# Patient Record
Sex: MALE | Race: WHITE | NOT HISPANIC OR LATINO | Employment: OTHER | URBAN - METROPOLITAN AREA
[De-identification: names, ages, dates, MRNs, and addresses within clinical notes are randomized per-mention and may not be internally consistent; named-entity substitution may affect disease eponyms.]

---

## 2017-01-01 ENCOUNTER — HISTORICAL (OUTPATIENT)
Dept: ADMINISTRATIVE | Facility: HOSPITAL | Age: 82
End: 2017-01-01

## 2017-01-01 ENCOUNTER — TELEPHONE (OUTPATIENT)
Dept: GASTROENTEROLOGY | Facility: CLINIC | Age: 82
End: 2017-01-01

## 2017-01-01 ENCOUNTER — TELEPHONE (OUTPATIENT)
Dept: FAMILY MEDICINE | Facility: CLINIC | Age: 82
End: 2017-01-01

## 2017-01-01 ENCOUNTER — PATIENT MESSAGE (OUTPATIENT)
Dept: OTOLARYNGOLOGY | Facility: CLINIC | Age: 82
End: 2017-01-01

## 2017-01-01 ENCOUNTER — DOCUMENTATION ONLY (OUTPATIENT)
Dept: FAMILY MEDICINE | Facility: CLINIC | Age: 82
End: 2017-01-01

## 2017-01-01 ENCOUNTER — SURGERY (OUTPATIENT)
Age: 82
End: 2017-01-01

## 2017-01-01 ENCOUNTER — ANESTHESIA (OUTPATIENT)
Dept: ENDOSCOPY | Facility: HOSPITAL | Age: 82
End: 2017-01-01
Payer: MEDICARE

## 2017-01-01 ENCOUNTER — TELEPHONE (OUTPATIENT)
Dept: UROLOGY | Facility: CLINIC | Age: 82
End: 2017-01-01

## 2017-01-01 ENCOUNTER — NURSE TRIAGE (OUTPATIENT)
Dept: ADMINISTRATIVE | Facility: CLINIC | Age: 82
End: 2017-01-01

## 2017-01-01 ENCOUNTER — OFFICE VISIT (OUTPATIENT)
Dept: GASTROENTEROLOGY | Facility: CLINIC | Age: 82
End: 2017-01-01
Payer: MEDICARE

## 2017-01-01 ENCOUNTER — HOSPITAL ENCOUNTER (OUTPATIENT)
Facility: HOSPITAL | Age: 82
Discharge: HOME HEALTH | End: 2017-03-03
Attending: INTERNAL MEDICINE | Admitting: INTERNAL MEDICINE
Payer: MEDICARE

## 2017-01-01 ENCOUNTER — HOSPITAL ENCOUNTER (INPATIENT)
Facility: HOSPITAL | Age: 82
LOS: 2 days | DRG: 871 | End: 2017-04-20
Attending: HOSPITALIST | Admitting: HOSPITALIST
Payer: MEDICARE

## 2017-01-01 ENCOUNTER — TELEPHONE (OUTPATIENT)
Dept: OTOLARYNGOLOGY | Facility: CLINIC | Age: 82
End: 2017-01-01

## 2017-01-01 ENCOUNTER — LAB VISIT (OUTPATIENT)
Dept: LAB | Facility: HOSPITAL | Age: 82
End: 2017-01-01
Attending: INTERNAL MEDICINE
Payer: MEDICARE

## 2017-01-01 ENCOUNTER — ANESTHESIA EVENT (OUTPATIENT)
Dept: ENDOSCOPY | Facility: HOSPITAL | Age: 82
End: 2017-01-01
Payer: MEDICARE

## 2017-01-01 ENCOUNTER — HOSPITAL ENCOUNTER (OUTPATIENT)
Facility: HOSPITAL | Age: 82
Discharge: HOME | End: 2017-02-16
Attending: INTERNAL MEDICINE | Admitting: INTERNAL MEDICINE
Payer: MEDICARE

## 2017-01-01 ENCOUNTER — OFFICE VISIT (OUTPATIENT)
Dept: OTOLARYNGOLOGY | Facility: CLINIC | Age: 82
End: 2017-01-01
Payer: MEDICARE

## 2017-01-01 ENCOUNTER — HOSPITAL ENCOUNTER (EMERGENCY)
Facility: HOSPITAL | Age: 82
Discharge: HOME | End: 2017-03-26
Attending: EMERGENCY MEDICINE
Payer: MEDICARE

## 2017-01-01 ENCOUNTER — HOSPITAL ENCOUNTER (OUTPATIENT)
Dept: RADIOLOGY | Facility: HOSPITAL | Age: 82
Discharge: HOME | End: 2017-01-04
Attending: OTOLARYNGOLOGY
Payer: MEDICARE

## 2017-01-01 VITALS
WEIGHT: 171 LBS | TEMPERATURE: 99 F | OXYGEN SATURATION: 97 % | SYSTOLIC BLOOD PRESSURE: 140 MMHG | RESPIRATION RATE: 18 BRPM | HEART RATE: 88 BPM | BODY MASS INDEX: 25.91 KG/M2 | DIASTOLIC BLOOD PRESSURE: 66 MMHG | HEIGHT: 68 IN

## 2017-01-01 VITALS
DIASTOLIC BLOOD PRESSURE: 75 MMHG | BODY MASS INDEX: 27 KG/M2 | HEART RATE: 65 BPM | HEIGHT: 67 IN | RESPIRATION RATE: 30 BRPM | TEMPERATURE: 98 F | OXYGEN SATURATION: 100 % | SYSTOLIC BLOOD PRESSURE: 161 MMHG | RESPIRATION RATE: 18 BRPM | WEIGHT: 172 LBS

## 2017-01-01 VITALS
WEIGHT: 174.19 LBS | DIASTOLIC BLOOD PRESSURE: 63 MMHG | SYSTOLIC BLOOD PRESSURE: 109 MMHG | TEMPERATURE: 96 F | BODY MASS INDEX: 26.88 KG/M2 | HEART RATE: 63 BPM

## 2017-01-01 VITALS — DIASTOLIC BLOOD PRESSURE: 56 MMHG | HEART RATE: 76 BPM | SYSTOLIC BLOOD PRESSURE: 122 MMHG

## 2017-01-01 VITALS
WEIGHT: 168 LBS | BODY MASS INDEX: 26.37 KG/M2 | HEART RATE: 63 BPM | OXYGEN SATURATION: 99 % | TEMPERATURE: 99 F | HEIGHT: 67 IN | SYSTOLIC BLOOD PRESSURE: 111 MMHG | RESPIRATION RATE: 18 BRPM | DIASTOLIC BLOOD PRESSURE: 56 MMHG

## 2017-01-01 VITALS
HEIGHT: 68 IN | WEIGHT: 171.75 LBS | SYSTOLIC BLOOD PRESSURE: 152 MMHG | DIASTOLIC BLOOD PRESSURE: 68 MMHG | BODY MASS INDEX: 26.03 KG/M2 | HEART RATE: 68 BPM

## 2017-01-01 VITALS — TEMPERATURE: 97 F | BODY MASS INDEX: 26.68 KG/M2 | HEIGHT: 67 IN | WEIGHT: 170 LBS

## 2017-01-01 DIAGNOSIS — T45.1X5D CHEMOTHERAPY ADVERSE REACTION, SUBSEQUENT ENCOUNTER: ICD-10-CM

## 2017-01-01 DIAGNOSIS — R65.20 SEPSIS WITH METABOLIC ENCEPHALOPATHY: ICD-10-CM

## 2017-01-01 DIAGNOSIS — C09.9 SQUAMOUS CELL CARCINOMA OF RIGHT TONSIL: ICD-10-CM

## 2017-01-01 DIAGNOSIS — I95.81 POSTPROCEDURAL HYPOTENSION: ICD-10-CM

## 2017-01-01 DIAGNOSIS — R63.30 FEEDING DIFFICULTIES: ICD-10-CM

## 2017-01-01 DIAGNOSIS — C79.89 SECONDARY MALIGNANCY OF NECK: ICD-10-CM

## 2017-01-01 DIAGNOSIS — C09.9 SQUAMOUS CELL CARCINOMA OF TONSIL: Primary | ICD-10-CM

## 2017-01-01 DIAGNOSIS — R63.30 FEEDING DIFFICULTY: ICD-10-CM

## 2017-01-01 DIAGNOSIS — R13.10 DYSPHAGIA, UNSPECIFIED TYPE: ICD-10-CM

## 2017-01-01 DIAGNOSIS — C09.9 SQUAMOUS CELL CARCINOMA OF TONSIL: ICD-10-CM

## 2017-01-01 DIAGNOSIS — I77.9 RIGHT-SIDED CAROTID ARTERY DISEASE: Primary | ICD-10-CM

## 2017-01-01 DIAGNOSIS — A41.9 SEPSIS WITH METABOLIC ENCEPHALOPATHY: ICD-10-CM

## 2017-01-01 DIAGNOSIS — K94.22 CELLULITIS AT GASTROSTOMY TUBE SITE: ICD-10-CM

## 2017-01-01 DIAGNOSIS — E03.4 HYPOTHYROIDISM DUE TO ACQUIRED ATROPHY OF THYROID: ICD-10-CM

## 2017-01-01 DIAGNOSIS — I67.2 CEREBRAL ATHEROSCLEROSIS: ICD-10-CM

## 2017-01-01 DIAGNOSIS — C18.7 CANCER OF SIGMOID COLON: Primary | ICD-10-CM

## 2017-01-01 DIAGNOSIS — R29.6 FREQUENT FALLS: ICD-10-CM

## 2017-01-01 DIAGNOSIS — I31.39 PERICARDIAL EFFUSION: ICD-10-CM

## 2017-01-01 DIAGNOSIS — C09.9 RIGHT TONSILLAR SQUAMOUS CELL CARCINOMA: Primary | ICD-10-CM

## 2017-01-01 DIAGNOSIS — C77.0 SECONDARY MALIGNANT NEOPLASM OF LYMPH NODES OF HEAD, FACE, OR NECK: ICD-10-CM

## 2017-01-01 DIAGNOSIS — I10 ESSENTIAL HYPERTENSION: Primary | Chronic | ICD-10-CM

## 2017-01-01 DIAGNOSIS — G93.41 ENCEPHALOPATHY, METABOLIC: Primary | ICD-10-CM

## 2017-01-01 DIAGNOSIS — I95.1 ORTHOSTATIC HYPOTENSION: ICD-10-CM

## 2017-01-01 DIAGNOSIS — G93.40 ENCEPHALOPATHY, UNSPECIFIED: ICD-10-CM

## 2017-01-01 DIAGNOSIS — M62.81 GENERALIZED MUSCLE WEAKNESS: ICD-10-CM

## 2017-01-01 DIAGNOSIS — K13.79 BLEEDING IN MOUTH: Primary | ICD-10-CM

## 2017-01-01 DIAGNOSIS — E86.0 DEHYDRATION, MODERATE: Primary | ICD-10-CM

## 2017-01-01 DIAGNOSIS — F32.9 REACTIVE DEPRESSION: ICD-10-CM

## 2017-01-01 DIAGNOSIS — R41.0 DISORIENTATION: Primary | ICD-10-CM

## 2017-01-01 DIAGNOSIS — Z93.1 STATUS POST INSERTION OF PERCUTANEOUS ENDOSCOPIC GASTROSTOMY (PEG) TUBE: ICD-10-CM

## 2017-01-01 DIAGNOSIS — E03.9 HYPOTHYROIDISM, UNSPECIFIED TYPE: Primary | ICD-10-CM

## 2017-01-01 DIAGNOSIS — C18.7 CANCER OF SIGMOID COLON: ICD-10-CM

## 2017-01-01 DIAGNOSIS — G93.41 SEPSIS WITH METABOLIC ENCEPHALOPATHY: ICD-10-CM

## 2017-01-01 DIAGNOSIS — L03.90 CELLULITIS: ICD-10-CM

## 2017-01-01 DIAGNOSIS — R41.82 ALTERED MENTAL STATE: ICD-10-CM

## 2017-01-01 DIAGNOSIS — L03.319 CELLULITIS AT GASTROSTOMY TUBE SITE: ICD-10-CM

## 2017-01-01 LAB
ABO + RH BLD: NORMAL
ADAMTS13 ACTIVITY: 50 %
ALBUMIN SERPL BCP-MCNC: 2.5 G/DL
ALBUMIN SERPL BCP-MCNC: 2.6 G/DL
ALBUMIN SERPL BCP-MCNC: 2.6 G/DL
ALBUMIN SERPL BCP-MCNC: 2.7 G/DL
ALBUMIN SERPL BCP-MCNC: 2.7 G/DL
ALBUMIN SERPL BCP-MCNC: 3 G/DL
ALBUMIN SERPL-MCNC: 3.2 G/DL (ref 3.1–4.7)
ALBUMIN SERPL-MCNC: 3.6 G/DL (ref 3.1–4.7)
ALBUMIN SERPL-MCNC: 3.9 G/DL (ref 3.1–4.7)
ALBUMIN SERPL-MCNC: 4 G/DL (ref 3.1–4.7)
ALBUMIN SERPL-MCNC: 4.2 G/DL (ref 3.1–4.7)
ALP SERPL-CCNC: 110 U/L
ALP SERPL-CCNC: 118 U/L
ALP SERPL-CCNC: 140 U/L
ALP SERPL-CCNC: 49 U/L
ALP SERPL-CCNC: 54 U/L
ALP SERPL-CCNC: 56 IU/L (ref 40–104)
ALP SERPL-CCNC: 57 IU/L (ref 40–104)
ALP SERPL-CCNC: 61 U/L
ALP SERPL-CCNC: 66 IU/L (ref 40–104)
ALP SERPL-CCNC: 69 IU/L (ref 40–104)
ALP SERPL-CCNC: 69 IU/L (ref 40–104)
ALP SERPL-CCNC: 97 U/L
ALT (SGPT): 12 IU/L (ref 3–33)
ALT (SGPT): 14 IU/L (ref 3–33)
ALT (SGPT): 14 IU/L (ref 3–33)
ALT (SGPT): 16 IU/L (ref 3–33)
ALT (SGPT): 17 IU/L (ref 3–33)
ALT SERPL W/O P-5'-P-CCNC: 11 U/L
ALT SERPL W/O P-5'-P-CCNC: 154 U/L
ALT SERPL W/O P-5'-P-CCNC: 155 U/L
ALT SERPL W/O P-5'-P-CCNC: 176 U/L
ALT SERPL W/O P-5'-P-CCNC: 209 U/L
AMMONIA PLAS-SCNC: 29 UMOL/L
AMMONIA PLAS-SCNC: 29 UMOL/L
ANION GAP SERPL CALC-SCNC: 10 MMOL/L
ANION GAP SERPL CALC-SCNC: 11 MMOL/L
ANION GAP SERPL CALC-SCNC: 12 MMOL/L
ANION GAP SERPL CALC-SCNC: 14 MMOL/L
ANION GAP SERPL CALC-SCNC: 15 MMOL/L
ANION GAP SERPL CALC-SCNC: 17 MMOL/L
ANION GAP SERPL CALC-SCNC: 18 MMOL/L
ANION GAP SERPL CALC-SCNC: 8 MMOL/L
ANISOCYTOSIS BLD QL SMEAR: ABNORMAL
ANISOCYTOSIS BLD QL SMEAR: ABNORMAL
AORTIC VALVE REGURGITATION: ABNORMAL
APTT BLDCRRT: 29.9 SEC
APTT BLDCRRT: 34 SEC
AST SERPL-CCNC: 152 U/L
AST SERPL-CCNC: 157 U/L
AST SERPL-CCNC: 162 U/L
AST SERPL-CCNC: 19 U/L
AST SERPL-CCNC: 19 U/L
AST SERPL-CCNC: 196 U/L
AST SERPL-CCNC: 21 U/L
AST SERPL-CCNC: 22 IU/L (ref 10–40)
AST SERPL-CCNC: 23 IU/L (ref 10–40)
AST SERPL-CCNC: 23 IU/L (ref 10–40)
AST SERPL-CCNC: 26 IU/L (ref 10–40)
AST SERPL-CCNC: 28 IU/L (ref 10–40)
BASOPHILS # BLD AUTO: 0 K/UL
BASOPHILS # BLD AUTO: 0.1 K/UL
BASOPHILS NFR BLD: 0 %
BASOPHILS NFR BLD: 0.1 %
BASOPHILS NFR BLD: 0.1 K/UL (ref 0–0.2)
BASOPHILS NFR BLD: 0.3 %
BASOPHILS NFR BLD: 0.5 %
BASOPHILS NFR BLD: 0.5 %
BASOPHILS NFR BLD: 0.7 %
BASOPHILS NFR BLD: 0.8 %
BASOPHILS NFR BLD: 0.8 %
BASOPHILS NFR BLD: 0.9 %
BASOPHILS NFR BLD: 1.2 %
BILIRUB SERPL-MCNC: 0.5 MG/DL (ref 0.3–1)
BILIRUB SERPL-MCNC: 0.7 MG/DL
BILIRUB SERPL-MCNC: 0.7 MG/DL (ref 0.3–1)
BILIRUB SERPL-MCNC: 0.8 MG/DL
BILIRUB SERPL-MCNC: 0.9 MG/DL
BILIRUB SERPL-MCNC: 1.2 MG/DL (ref 0.3–1)
BILIRUB SERPL-MCNC: 1.3 MG/DL (ref 0.3–1)
BILIRUB SERPL-MCNC: 1.4 MG/DL (ref 0.3–1)
BILIRUB SERPL-MCNC: 4.1 MG/DL
BILIRUB SERPL-MCNC: 4.2 MG/DL
BILIRUB SERPL-MCNC: 5 MG/DL
BILIRUB SERPL-MCNC: 6.2 MG/DL
BILIRUB UR QL STRIP: NEGATIVE
BLD GP AB SCN CELLS X3 SERPL QL: NORMAL
BLD GP AB SCN CELLS X3 SERPL QL: NORMAL
BLD PROD TYP BPU: NORMAL
BLOOD UNIT EXPIRATION DATE: NORMAL
BLOOD UNIT TYPE CODE: 5100
BLOOD UNIT TYPE CODE: 6200
BLOOD UNIT TYPE CODE: 6200
BLOOD UNIT TYPE: NORMAL
BNP SERPL-MCNC: 1155 PG/ML
BUN SERPL-MCNC: 14 MG/DL
BUN SERPL-MCNC: 16 MG/DL
BUN SERPL-MCNC: 16 MG/DL (ref 8–20)
BUN SERPL-MCNC: 16 MG/DL (ref 8–20)
BUN SERPL-MCNC: 18 MG/DL
BUN SERPL-MCNC: 18 MG/DL (ref 8–20)
BUN SERPL-MCNC: 22 MG/DL (ref 8–20)
BUN SERPL-MCNC: 25 MG/DL (ref 8–20)
BUN SERPL-MCNC: 50 MG/DL
BUN SERPL-MCNC: 56 MG/DL
BUN SERPL-MCNC: 57 MG/DL
BUN SERPL-MCNC: 64 MG/DL
BUN SERPL-MCNC: 80 MG/DL
CALCIUM SERPL-MCNC: 8 MG/DL
CALCIUM SERPL-MCNC: 8.1 MG/DL
CALCIUM SERPL-MCNC: 8.2 MG/DL
CALCIUM SERPL-MCNC: 8.2 MG/DL
CALCIUM SERPL-MCNC: 8.4 MG/DL (ref 7.7–10.4)
CALCIUM SERPL-MCNC: 8.6 MG/DL
CALCIUM SERPL-MCNC: 8.7 MG/DL (ref 7.7–10.4)
CALCIUM SERPL-MCNC: 8.8 MG/DL (ref 7.7–10.4)
CALCIUM SERPL-MCNC: 8.9 MG/DL (ref 7.7–10.4)
CALCIUM SERPL-MCNC: 9 MG/DL (ref 7.7–10.4)
CALCIUM SERPL-MCNC: 9.1 MG/DL
CHLORIDE SERPL-SCNC: 101 MMOL/L
CHLORIDE SERPL-SCNC: 104 MMOL/L
CHLORIDE SERPL-SCNC: 89 MMOL/L
CHLORIDE SERPL-SCNC: 91 MMOL/L
CHLORIDE SERPL-SCNC: 92 MMOL/L
CHLORIDE SERPL-SCNC: 92 MMOL/L
CHLORIDE SERPL-SCNC: 95 MMOL/L
CHLORIDE SERPL-SCNC: 99 MMOL/L
CHLORIDE: 100 MMOL/L (ref 98–110)
CHLORIDE: 97 MMOL/L (ref 98–110)
CHLORIDE: 98 MMOL/L (ref 98–110)
CHLORIDE: 98 MMOL/L (ref 98–110)
CHLORIDE: 99 MMOL/L (ref 98–110)
CLARITY UR: CLEAR
CO2 SERPL-SCNC: 17 MMOL/L
CO2 SERPL-SCNC: 18 MMOL/L
CO2 SERPL-SCNC: 21 MMOL/L
CO2 SERPL-SCNC: 22 MMOL/L
CO2 SERPL-SCNC: 23 MMOL/L
CO2 SERPL-SCNC: 24.9 MMOL/L (ref 22.8–31.6)
CO2 SERPL-SCNC: 25 MMOL/L
CO2 SERPL-SCNC: 25 MMOL/L
CO2 SERPL-SCNC: 26 MMOL/L
CO2 SERPL-SCNC: 26.9 MMOL/L (ref 22.8–31.6)
CO2 SERPL-SCNC: 27.5 MMOL/L (ref 22.8–31.6)
CO2 SERPL-SCNC: 28.1 MMOL/L (ref 22.8–31.6)
CO2 SERPL-SCNC: 28.2 MMOL/L (ref 22.8–31.6)
COAGULATION SPECIALIST REVIEW: ABNORMAL
CODING SYSTEM: NORMAL
COLOR UR: YELLOW
CREAT SERPL-MCNC: 0.9 MG/DL
CREAT SERPL-MCNC: 0.9 MG/DL
CREAT SERPL-MCNC: 1 MG/DL
CREAT SERPL-MCNC: 1.4 MG/DL
CREAT SERPL-MCNC: 1.8 MG/DL
CREAT SERPL-MCNC: 2.4 MG/DL
CREATININE: 0.91 MG/DL (ref 0.6–1.4)
CREATININE: 1.02 MG/DL (ref 0.6–1.4)
CREATININE: 1.05 MG/DL (ref 0.6–1.4)
CREATININE: 1.17 MG/DL (ref 0.6–1.4)
CREATININE: 1.37 MG/DL (ref 0.6–1.4)
D DIMER PPP IA.FEU-MCNC: 23.98 MG/L FEU
DAT IGG-SP REAG RBC-IMP: NORMAL
DIASTOLIC DYSFUNCTION: NO
DIFFERENTIAL METHOD: ABNORMAL
DISPENSE STATUS: NORMAL
EOSINOPHIL # BLD AUTO: 0 K/UL
EOSINOPHIL # BLD AUTO: 0.2 K/UL
EOSINOPHIL # BLD AUTO: 0.2 K/UL
EOSINOPHIL # BLD AUTO: 0.3 K/UL
EOSINOPHIL # BLD AUTO: 0.3 K/UL
EOSINOPHIL NFR BLD: 0 %
EOSINOPHIL NFR BLD: 0.1 K/UL (ref 0–0.7)
EOSINOPHIL NFR BLD: 0.2 %
EOSINOPHIL NFR BLD: 0.2 %
EOSINOPHIL NFR BLD: 0.2 K/UL (ref 0–0.7)
EOSINOPHIL NFR BLD: 0.2 K/UL (ref 0–0.7)
EOSINOPHIL NFR BLD: 0.3 %
EOSINOPHIL NFR BLD: 0.3 %
EOSINOPHIL NFR BLD: 1.5 %
EOSINOPHIL NFR BLD: 1.6 %
EOSINOPHIL NFR BLD: 1.7 %
EOSINOPHIL NFR BLD: 1.8 %
EOSINOPHIL NFR BLD: 2 %
EOSINOPHIL NFR BLD: 2.8 %
EOSINOPHIL NFR BLD: 2.8 %
ERYTHROCYTE [DISTWIDTH] IN BLOOD BY AUTOMATED COUNT: 13.6 % (ref 12.5–14.5)
ERYTHROCYTE [DISTWIDTH] IN BLOOD BY AUTOMATED COUNT: 13.9 %
ERYTHROCYTE [DISTWIDTH] IN BLOOD BY AUTOMATED COUNT: 13.9 % (ref 12.5–14.5)
ERYTHROCYTE [DISTWIDTH] IN BLOOD BY AUTOMATED COUNT: 14 % (ref 12.5–14.5)
ERYTHROCYTE [DISTWIDTH] IN BLOOD BY AUTOMATED COUNT: 14.1 %
ERYTHROCYTE [DISTWIDTH] IN BLOOD BY AUTOMATED COUNT: 14.2 %
ERYTHROCYTE [DISTWIDTH] IN BLOOD BY AUTOMATED COUNT: 14.2 % (ref 12.5–14.5)
ERYTHROCYTE [DISTWIDTH] IN BLOOD BY AUTOMATED COUNT: 14.4 % (ref 12.5–14.5)
ERYTHROCYTE [DISTWIDTH] IN BLOOD BY AUTOMATED COUNT: 14.8 %
ERYTHROCYTE [DISTWIDTH] IN BLOOD BY AUTOMATED COUNT: 22.3 %
ERYTHROCYTE [DISTWIDTH] IN BLOOD BY AUTOMATED COUNT: 22.3 %
ERYTHROCYTE [DISTWIDTH] IN BLOOD BY AUTOMATED COUNT: 22.4 %
ERYTHROCYTE [DISTWIDTH] IN BLOOD BY AUTOMATED COUNT: 23.2 %
ERYTHROCYTE [DISTWIDTH] IN BLOOD BY AUTOMATED COUNT: 23.7 %
EST. GFR  (AFRICAN AMERICAN): 27 ML/MIN/1.73 M^2
EST. GFR  (AFRICAN AMERICAN): 39 ML/MIN/1.73 M^2
EST. GFR  (AFRICAN AMERICAN): 52 ML/MIN/1.73 M^2
EST. GFR  (AFRICAN AMERICAN): >60 ML/MIN/1.73 M^2
EST. GFR  (NON AFRICAN AMERICAN): 24 ML/MIN/1.73 M^2
EST. GFR  (NON AFRICAN AMERICAN): 33 ML/MIN/1.73 M^2
EST. GFR  (NON AFRICAN AMERICAN): 45 ML/MIN/1.73 M^2
EST. GFR  (NON AFRICAN AMERICAN): >60 ML/MIN/1.73 M^2
FIBRINOGEN PPP-MCNC: 91 MG/DL
FIBRINOGEN PPP-MCNC: 92 MG/DL
FIBRINOGEN PPP-MCNC: 96 MG/DL
GLOBAL PERICARDIAL EFFUSION: ABNORMAL
GLUCOSE SERPL-MCNC: 102 MG/DL
GLUCOSE SERPL-MCNC: 103 MG/DL
GLUCOSE SERPL-MCNC: 105 MG/DL
GLUCOSE SERPL-MCNC: 113 MG/DL
GLUCOSE SERPL-MCNC: 115 MG/DL
GLUCOSE SERPL-MCNC: 128 MG/DL
GLUCOSE SERPL-MCNC: 192 MG/DL
GLUCOSE SERPL-MCNC: 275 MG/DL
GLUCOSE UR QL STRIP: NEGATIVE
GLUCOSE: 106 MG/DL (ref 70–99)
GLUCOSE: 121 MG/DL (ref 70–99)
GLUCOSE: 141 MG/DL (ref 70–99)
GLUCOSE: 95 MG/DL (ref 70–99)
GLUCOSE: 97 MG/DL (ref 70–99)
GRAN #: 5.4 K/UL (ref 1.4–6.5)
GRAN #: 6.1 K/UL (ref 1.4–6.5)
GRAN #: 6.6 K/UL (ref 1.4–6.5)
GRAN #: 6.8 K/UL (ref 1.4–6.5)
GRAN #: 8.3 K/UL (ref 1.4–6.5)
GRAN%: 69.3 %
GRAN%: 78 %
GRAN%: 78.3 %
GRAN%: 82.1 %
GRAN%: 87.3 %
HAPTOGLOB SERPL-MCNC: <10 MG/DL
HAPTOGLOB SERPL-MCNC: <10 MG/DL
HCT VFR BLD AUTO: 27.4 %
HCT VFR BLD AUTO: 27.7 %
HCT VFR BLD AUTO: 27.8 %
HCT VFR BLD AUTO: 27.8 %
HCT VFR BLD AUTO: 28.1 %
HCT VFR BLD AUTO: 34.6 %
HCT VFR BLD AUTO: 34.8 % (ref 39–55)
HCT VFR BLD AUTO: 35 %
HCT VFR BLD AUTO: 39.4 %
HCT VFR BLD AUTO: 39.6 %
HCT VFR BLD AUTO: 39.9 % (ref 39–55)
HCT VFR BLD AUTO: 40.5 % (ref 39–55)
HCT VFR BLD AUTO: 40.8 % (ref 39–55)
HCT VFR BLD AUTO: 41.9 % (ref 39–55)
HGB BLD-MCNC: 11.4 G/DL
HGB BLD-MCNC: 11.4 G/DL (ref 14–16)
HGB BLD-MCNC: 11.5 G/DL
HGB BLD-MCNC: 12.8 G/DL
HGB BLD-MCNC: 12.8 G/DL (ref 14–16)
HGB BLD-MCNC: 13 G/DL
HGB BLD-MCNC: 13.1 G/DL (ref 14–16)
HGB BLD-MCNC: 13.2 G/DL (ref 14–16)
HGB BLD-MCNC: 13.6 G/DL (ref 14–16)
HGB BLD-MCNC: 8.4 G/DL
HGB BLD-MCNC: 8.7 G/DL
HGB BLD-MCNC: 8.8 G/DL
HGB BLD-MCNC: 9 G/DL
HGB BLD-MCNC: 9.2 G/DL
HGB UR QL STRIP: ABNORMAL
IMMATURE GRANS (ABS): 0 K/UL (ref 0–1)
IMMATURE GRANS (ABS): 0.1 K/UL (ref 0–1)
IMMATURE GRANULOCYTES: 0.2 %
IMMATURE GRANULOCYTES: 0.3 %
IMMATURE GRANULOCYTES: 0.3 %
IMMATURE GRANULOCYTES: 0.4 %
IMMATURE GRANULOCYTES: 0.5 %
INR PPP: 1.1
INR PPP: 1.9
KETONES UR QL STRIP: NEGATIVE
LACTATE SERPL-SCNC: 1.9 MMOL/L
LACTATE SERPL-SCNC: 3.3 MMOL/L
LDH SERPL L TO P-CCNC: 1036 U/L
LEUKOCYTE ESTERASE UR QL STRIP: NEGATIVE
LYMPH #: 0.3 K/UL (ref 1.2–3.4)
LYMPH #: 0.4 K/UL (ref 1.2–3.4)
LYMPH #: 0.6 K/UL (ref 1.2–3.4)
LYMPH #: 0.9 K/UL (ref 1.2–3.4)
LYMPH #: 1.3 K/UL (ref 1.2–3.4)
LYMPH%: 10.6 %
LYMPH%: 17.1 %
LYMPH%: 3 %
LYMPH%: 5 %
LYMPH%: 7.5 %
LYMPHOCYTES # BLD AUTO: 0.2 K/UL
LYMPHOCYTES # BLD AUTO: 0.3 K/UL
LYMPHOCYTES # BLD AUTO: 0.3 K/UL
LYMPHOCYTES # BLD AUTO: 0.5 K/UL
LYMPHOCYTES # BLD AUTO: 1 K/UL
LYMPHOCYTES NFR BLD: 1.1 %
LYMPHOCYTES NFR BLD: 1.5 %
LYMPHOCYTES NFR BLD: 1.8 %
LYMPHOCYTES NFR BLD: 10.3 %
LYMPHOCYTES NFR BLD: 2.1 %
LYMPHOCYTES NFR BLD: 2.2 %
LYMPHOCYTES NFR BLD: 3.1 %
LYMPHOCYTES NFR BLD: 3.8 %
LYMPHOCYTES NFR BLD: 5 %
MAGNESIUM SERPL-MCNC: 2.2 MG/DL
MAGNESIUM SERPL-MCNC: 2.4 MG/DL
MCH RBC QN AUTO: 27.8 PG
MCH RBC QN AUTO: 28 PG
MCH RBC QN AUTO: 28.1 PG
MCH RBC QN AUTO: 28.2 PG
MCH RBC QN AUTO: 28.2 PG (ref 25–35)
MCH RBC QN AUTO: 28.2 PG (ref 25–35)
MCH RBC QN AUTO: 28.3 PG
MCH RBC QN AUTO: 28.3 PG (ref 25–35)
MCH RBC QN AUTO: 28.4 PG (ref 25–35)
MCH RBC QN AUTO: 28.6 PG (ref 25–35)
MCH RBC QN AUTO: 28.9 PG
MCH RBC QN AUTO: 28.9 PG
MCH RBC QN AUTO: 29.1 PG
MCH RBC QN AUTO: 29.8 PG
MCHC RBC AUTO-ENTMCNC: 30.5 %
MCHC RBC AUTO-ENTMCNC: 31.7 %
MCHC RBC AUTO-ENTMCNC: 31.8 %
MCHC RBC AUTO-ENTMCNC: 31.9 %
MCHC RBC AUTO-ENTMCNC: 32.1 G/DL (ref 31–36)
MCHC RBC AUTO-ENTMCNC: 32.2 %
MCHC RBC AUTO-ENTMCNC: 32.3 G/DL (ref 31–36)
MCHC RBC AUTO-ENTMCNC: 32.4 G/DL (ref 31–36)
MCHC RBC AUTO-ENTMCNC: 32.5 G/DL (ref 31–36)
MCHC RBC AUTO-ENTMCNC: 32.8 G/DL (ref 31–36)
MCHC RBC AUTO-ENTMCNC: 32.9 %
MCHC RBC AUTO-ENTMCNC: 32.9 %
MCHC RBC AUTO-ENTMCNC: 33 %
MCHC RBC AUTO-ENTMCNC: 33.1 %
MCV RBC AUTO: 86 FL
MCV RBC AUTO: 86.7 FL (ref 80–100)
MCV RBC AUTO: 87.2 FL (ref 80–100)
MCV RBC AUTO: 87.2 FL (ref 80–100)
MCV RBC AUTO: 87.7 FL (ref 80–100)
MCV RBC AUTO: 88.1 FL (ref 80–100)
MCV RBC AUTO: 90 FL
MCV RBC AUTO: 91 FL
MCV RBC AUTO: 92 FL
MICROSCOPIC COMMENT: ABNORMAL
MONO #: 0.7 K/UL (ref 0.1–0.6)
MONO #: 0.8 K/UL (ref 0.1–0.6)
MONO #: 0.9 K/UL (ref 0.1–0.6)
MONO%: 10.1 %
MONO%: 11.4 %
MONO%: 7 %
MONO%: 8.3 %
MONO%: 9.3 %
MONOCYTES # BLD AUTO: 0.6 K/UL
MONOCYTES # BLD AUTO: 0.8 K/UL
MONOCYTES # BLD AUTO: 1 K/UL
MONOCYTES # BLD AUTO: 1 K/UL
MONOCYTES NFR BLD: 1 %
MONOCYTES NFR BLD: 4.6 %
MONOCYTES NFR BLD: 4.7 %
MONOCYTES NFR BLD: 4.9 %
MONOCYTES NFR BLD: 7.1 %
MONOCYTES NFR BLD: 7.6 %
MONOCYTES NFR BLD: 8 %
MONOCYTES NFR BLD: 8.4 %
MONOCYTES NFR BLD: 8.8 %
NEUTROPHILS # BLD AUTO: 10 K/UL
NEUTROPHILS # BLD AUTO: 11 K/UL
NEUTROPHILS # BLD AUTO: 11.5 K/UL
NEUTROPHILS # BLD AUTO: 11.6 K/UL
NEUTROPHILS # BLD AUTO: 12 K/UL
NEUTROPHILS # BLD AUTO: 13 K/UL
NEUTROPHILS # BLD AUTO: 7.8 K/UL
NEUTROPHILS # BLD AUTO: 8.2 K/UL
NEUTROPHILS NFR BLD: 79 %
NEUTROPHILS NFR BLD: 85.2 %
NEUTROPHILS NFR BLD: 85.9 %
NEUTROPHILS NFR BLD: 87 %
NEUTROPHILS NFR BLD: 88.7 %
NEUTROPHILS NFR BLD: 90.1 %
NEUTROPHILS NFR BLD: 93 %
NEUTROPHILS NFR BLD: 93.5 %
NEUTROPHILS NFR BLD: 93.6 %
NEUTS BAND NFR BLD MANUAL: 7 %
NITRITE UR QL STRIP: NEGATIVE
NRBC BLD-RTO: 2 /100 WBC
NUCLEATED RBCS: 0 %
NUCLEATED RED BLOOD CELLS: 0 /100 WBC
OSMOLALITY SERPL: 290 MOSM/KG
OVALOCYTES BLD QL SMEAR: ABNORMAL
PERFORMED BY:: ABNORMAL
PERFORMED BY:: NORMAL
PH UR STRIP: 6 [PH] (ref 5–8)
PHOSPHATE SERPL-MCNC: 5 MG/DL
PHOSPHATE SERPL-MCNC: 5.6 MG/DL
PHOSPHATE SERPL-MCNC: 6.4 MG/DL
PLATELET # BLD AUTO: 10 K/UL
PLATELET # BLD AUTO: 104 K/UL (ref 140–440)
PLATELET # BLD AUTO: 109 K/UL (ref 140–440)
PLATELET # BLD AUTO: 164 K/UL (ref 140–440)
PLATELET # BLD AUTO: 179 K/UL
PLATELET # BLD AUTO: 212 K/UL (ref 140–440)
PLATELET # BLD AUTO: 45 K/UL
PLATELET # BLD AUTO: 6 K/UL
PLATELET # BLD AUTO: 68 K/UL
PLATELET # BLD AUTO: 7 K/UL
PLATELET # BLD AUTO: 75 K/UL
PLATELET # BLD AUTO: 81 K/UL (ref 140–440)
PLATELET # BLD AUTO: 9 K/UL
PLATELET # BLD AUTO: 90 K/UL
PLATELET BLD QL SMEAR: ABNORMAL
PMV BLD AUTO: 10.1 FL (ref 8.8–12.7)
PMV BLD AUTO: 10.4 FL
PMV BLD AUTO: 7.3 FL
PMV BLD AUTO: 7.5 FL
PMV BLD AUTO: 7.9 FL
PMV BLD AUTO: 8 FL
PMV BLD AUTO: 8 FL
PMV BLD AUTO: 8.3 FL
PMV BLD AUTO: 8.4 FL (ref 7.4–10.4)
PMV BLD AUTO: 8.8 FL
PMV BLD AUTO: 8.9 FL (ref 7.4–10.4)
PMV BLD AUTO: 9.1 FL (ref 8.8–12.7)
PMV BLD AUTO: 9.4 FL (ref 8.8–12.7)
PMV BLD AUTO: 9.6 FL
POIKILOCYTOSIS BLD QL SMEAR: SLIGHT
POIKILOCYTOSIS BLD QL SMEAR: SLIGHT
POLYCHROMASIA BLD QL SMEAR: ABNORMAL
POLYCHROMASIA BLD QL SMEAR: ABNORMAL
POTASSIUM SERPL-SCNC: 3.8 MMOL/L
POTASSIUM SERPL-SCNC: 4 MMOL/L (ref 3.5–5)
POTASSIUM SERPL-SCNC: 4.1 MMOL/L
POTASSIUM SERPL-SCNC: 4.1 MMOL/L
POTASSIUM SERPL-SCNC: 4.2 MMOL/L (ref 3.5–5)
POTASSIUM SERPL-SCNC: 4.2 MMOL/L (ref 3.5–5)
POTASSIUM SERPL-SCNC: 4.5 MMOL/L
POTASSIUM SERPL-SCNC: 4.5 MMOL/L (ref 3.5–5)
POTASSIUM SERPL-SCNC: 4.6 MMOL/L
POTASSIUM SERPL-SCNC: 4.7 MMOL/L
POTASSIUM SERPL-SCNC: 4.7 MMOL/L (ref 3.5–5)
POTASSIUM SERPL-SCNC: 4.8 MMOL/L
POTASSIUM SERPL-SCNC: 5 MMOL/L
PROT SERPL-MCNC: 5.5 G/DL
PROT SERPL-MCNC: 5.6 G/DL
PROT SERPL-MCNC: 5.7 G/DL
PROT SERPL-MCNC: 5.7 G/DL
PROT SERPL-MCNC: 5.8 G/DL
PROT SERPL-MCNC: 5.8 G/DL
PROT SERPL-MCNC: 6.4 G/DL (ref 6–8.2)
PROT SERPL-MCNC: 6.5 G/DL
PROT SERPL-MCNC: 6.9 G/DL (ref 6–8.2)
PROT SERPL-MCNC: 7.1 G/DL (ref 6–8.2)
PROT SERPL-MCNC: 7.3 G/DL (ref 6–8.2)
PROT SERPL-MCNC: 7.5 G/DL (ref 6–8.2)
PROT UR QL STRIP: NEGATIVE
PROTHROMBIN TIME: 11.9 SEC
PROTHROMBIN TIME: 19.9 SEC
RBC # BLD AUTO: 3.01 M/UL
RBC # BLD AUTO: 3.01 M/UL
RBC # BLD AUTO: 3.04 M/UL
RBC # BLD AUTO: 3.08 M/UL
RBC # BLD AUTO: 3.08 M/UL
RBC # BLD AUTO: 3.99 M/UL (ref 4.3–5.9)
RBC # BLD AUTO: 4.04 M/UL
RBC # BLD AUTO: 4.09 M/UL
RBC # BLD AUTO: 4.53 M/UL (ref 4.3–5.9)
RBC # BLD AUTO: 4.59 M/UL
RBC # BLD AUTO: 4.6 M/UL
RBC # BLD AUTO: 4.62 M/UL (ref 4.3–5.9)
RBC # BLD AUTO: 4.68 M/UL (ref 4.3–5.9)
RBC # BLD AUTO: 4.83 M/UL (ref 4.3–5.9)
RBC #/AREA URNS HPF: 5 /HPF (ref 0–4)
RETIRED EF AND QEF - SEE NOTES: 70 (ref 55–65)
SCHISTOCYTES BLD QL SMEAR: PRESENT
SCHISTOCYTES BLD QL SMEAR: PRESENT
SODIUM SERPL-SCNC: 125 MMOL/L
SODIUM SERPL-SCNC: 126 MMOL/L
SODIUM SERPL-SCNC: 127 MMOL/L
SODIUM SERPL-SCNC: 127 MMOL/L
SODIUM SERPL-SCNC: 130 MMOL/L
SODIUM SERPL-SCNC: 136 MMOL/L
SODIUM SERPL-SCNC: 136 MMOL/L
SODIUM SERPL-SCNC: 138 MMOL/L
SODIUM: 132 MMOL/L (ref 134–144)
SODIUM: 132 MMOL/L (ref 134–144)
SODIUM: 133 MMOL/L (ref 134–144)
SODIUM: 135 MMOL/L (ref 134–144)
SODIUM: 135 MMOL/L (ref 134–144)
SP GR UR STRIP: 1.01 (ref 1–1.03)
TARGETS BLD QL SMEAR: ABNORMAL
TARGETS BLD QL SMEAR: ABNORMAL
TRANS PLATPHERESIS VOL PATIENT: NORMAL ML
TRANS PLATPHERESIS VOL PATIENT: NORMAL ML
UNIT NUMBER: NORMAL
URN SPEC COLLECT METH UR: ABNORMAL
UROBILINOGEN UR STRIP-ACNC: NEGATIVE EU/DL
WBC # BLD AUTO: 11.3 K/UL
WBC # BLD AUTO: 12.3 K/UL
WBC # BLD AUTO: 12.8 K/UL
WBC # BLD AUTO: 12.9 K/UL
WBC # BLD AUTO: 12.9 K/UL
WBC # BLD AUTO: 13.9 K/UL
WBC # BLD AUTO: 19.1 K/UL
WBC # BLD AUTO: 9.7 K/UL
WBC # BLD AUTO: 9.9 K/UL
WBC # BLD: 7.7 K/UL (ref 5–10)
WBC # BLD: 7.8 K/UL (ref 5–10)
WBC # BLD: 8.3 K/UL (ref 5–10)
WBC # BLD: 8.5 K/UL (ref 5–10)
WBC # BLD: 9.5 K/UL (ref 5–10)
WBC #/AREA URNS HPF: 1 /HPF (ref 0–5)

## 2017-01-01 PROCEDURE — G0378 HOSPITAL OBSERVATION PER HR: HCPCS

## 2017-01-01 PROCEDURE — 85025 COMPLETE CBC W/AUTO DIFF WBC: CPT

## 2017-01-01 PROCEDURE — 83615 LACTATE (LD) (LDH) ENZYME: CPT

## 2017-01-01 PROCEDURE — D9220A PRA ANESTHESIA: Mod: CRNA,,, | Performed by: NURSE ANESTHETIST, CERTIFIED REGISTERED

## 2017-01-01 PROCEDURE — 86920 COMPATIBILITY TEST SPIN: CPT

## 2017-01-01 PROCEDURE — 27201018 HC KIT, PEG (ANY): Performed by: INTERNAL MEDICINE

## 2017-01-01 PROCEDURE — 86023 IMMUNOGLOBULIN ASSAY: CPT

## 2017-01-01 PROCEDURE — 84100 ASSAY OF PHOSPHORUS: CPT

## 2017-01-01 PROCEDURE — 80053 COMPREHEN METABOLIC PANEL: CPT

## 2017-01-01 PROCEDURE — 83930 ASSAY OF BLOOD OSMOLALITY: CPT

## 2017-01-01 PROCEDURE — 63600175 PHARM REV CODE 636 W HCPCS: Performed by: INTERNAL MEDICINE

## 2017-01-01 PROCEDURE — 83735 ASSAY OF MAGNESIUM: CPT

## 2017-01-01 PROCEDURE — 94640 AIRWAY INHALATION TREATMENT: CPT

## 2017-01-01 PROCEDURE — 63600175 PHARM REV CODE 636 W HCPCS: Performed by: HOSPITALIST

## 2017-01-01 PROCEDURE — 99214 OFFICE O/P EST MOD 30 MIN: CPT | Mod: S$GLB,,, | Performed by: OTOLARYNGOLOGY

## 2017-01-01 PROCEDURE — 99999 PR PBB SHADOW E&M-EST. PATIENT-LVL III: CPT | Mod: PBBFAC,,, | Performed by: OTOLARYNGOLOGY

## 2017-01-01 PROCEDURE — 25000242 PHARM REV CODE 250 ALT 637 W/ HCPCS: Performed by: HOSPITALIST

## 2017-01-01 PROCEDURE — 1157F ADVNC CARE PLAN IN RCRD: CPT | Mod: S$GLB,,, | Performed by: OTOLARYNGOLOGY

## 2017-01-01 PROCEDURE — 86880 COOMBS TEST DIRECT: CPT

## 2017-01-01 PROCEDURE — 37000008 HC ANESTHESIA 1ST 15 MINUTES: Performed by: INTERNAL MEDICINE

## 2017-01-01 PROCEDURE — 63600175 PHARM REV CODE 636 W HCPCS

## 2017-01-01 PROCEDURE — 83010 ASSAY OF HAPTOGLOBIN QUANT: CPT

## 2017-01-01 PROCEDURE — 85379 FIBRIN DEGRADATION QUANT: CPT

## 2017-01-01 PROCEDURE — 36415 COLL VENOUS BLD VENIPUNCTURE: CPT

## 2017-01-01 PROCEDURE — 1159F MED LIST DOCD IN RCRD: CPT | Mod: S$GLB,,, | Performed by: INTERNAL MEDICINE

## 2017-01-01 PROCEDURE — 12000002 HC ACUTE/MED SURGE SEMI-PRIVATE ROOM

## 2017-01-01 PROCEDURE — 1160F RVW MEDS BY RX/DR IN RCRD: CPT | Mod: S$GLB,,, | Performed by: INTERNAL MEDICINE

## 2017-01-01 PROCEDURE — 25000003 PHARM REV CODE 250: Performed by: HOSPITALIST

## 2017-01-01 PROCEDURE — 93306 TTE W/DOPPLER COMPLETE: CPT | Mod: 26,,, | Performed by: INTERNAL MEDICINE

## 2017-01-01 PROCEDURE — 85384 FIBRINOGEN ACTIVITY: CPT

## 2017-01-01 PROCEDURE — 97802 MEDICAL NUTRITION INDIV IN: CPT | Performed by: DIETITIAN, REGISTERED

## 2017-01-01 PROCEDURE — 99999 PR PBB SHADOW E&M-EST. PATIENT-LVL II: CPT | Mod: PBBFAC,,, | Performed by: INTERNAL MEDICINE

## 2017-01-01 PROCEDURE — 99214 OFFICE O/P EST MOD 30 MIN: CPT | Mod: S$GLB,,, | Performed by: INTERNAL MEDICINE

## 2017-01-01 PROCEDURE — 99220 PR INITIAL OBSERVATION CARE,LEVL III: CPT | Mod: ,,, | Performed by: INTERNAL MEDICINE

## 2017-01-01 PROCEDURE — 1126F AMNT PAIN NOTED NONE PRSNT: CPT | Mod: S$GLB,,, | Performed by: OTOLARYNGOLOGY

## 2017-01-01 PROCEDURE — 27000221 HC OXYGEN, UP TO 24 HOURS

## 2017-01-01 PROCEDURE — C9113 INJ PANTOPRAZOLE SODIUM, VIA: HCPCS | Performed by: INTERNAL MEDICINE

## 2017-01-01 PROCEDURE — 83605 ASSAY OF LACTIC ACID: CPT

## 2017-01-01 PROCEDURE — 83880 ASSAY OF NATRIURETIC PEPTIDE: CPT

## 2017-01-01 PROCEDURE — 82140 ASSAY OF AMMONIA: CPT

## 2017-01-01 PROCEDURE — 85027 COMPLETE CBC AUTOMATED: CPT

## 2017-01-01 PROCEDURE — 1157F ADVNC CARE PLAN IN RCRD: CPT | Mod: S$GLB,,, | Performed by: INTERNAL MEDICINE

## 2017-01-01 PROCEDURE — 99217 PR OBSERVATION CARE DISCHARGE: CPT | Mod: ,,, | Performed by: INTERNAL MEDICINE

## 2017-01-01 PROCEDURE — 86965 POOLING BLOOD PLATELETS: CPT

## 2017-01-01 PROCEDURE — 43246 EGD PLACE GASTROSTOMY TUBE: CPT | Performed by: INTERNAL MEDICINE

## 2017-01-01 PROCEDURE — 87040 BLOOD CULTURE FOR BACTERIA: CPT

## 2017-01-01 PROCEDURE — 93306 TTE W/DOPPLER COMPLETE: CPT

## 2017-01-01 PROCEDURE — 99225 PR SUBSEQUENT OBSERVATION CARE,LEVEL II: CPT | Mod: ,,, | Performed by: INTERNAL MEDICINE

## 2017-01-01 PROCEDURE — 63600175 PHARM REV CODE 636 W HCPCS: Performed by: NURSE PRACTITIONER

## 2017-01-01 PROCEDURE — 85610 PROTHROMBIN TIME: CPT

## 2017-01-01 PROCEDURE — 94761 N-INVAS EAR/PLS OXIMETRY MLT: CPT

## 2017-01-01 PROCEDURE — 36430 TRANSFUSION BLD/BLD COMPNT: CPT

## 2017-01-01 PROCEDURE — 25000003 PHARM REV CODE 250: Performed by: INTERNAL MEDICINE

## 2017-01-01 PROCEDURE — 88305 TISSUE EXAM BY PATHOLOGIST: CPT | Performed by: PATHOLOGY

## 2017-01-01 PROCEDURE — D9220A PRA ANESTHESIA: Mod: ANES,,, | Performed by: ANESTHESIOLOGY

## 2017-01-01 PROCEDURE — 85730 THROMBOPLASTIN TIME PARTIAL: CPT

## 2017-01-01 PROCEDURE — 83010 ASSAY OF HAPTOGLOBIN QUANT: CPT | Mod: 91

## 2017-01-01 PROCEDURE — 37000009 HC ANESTHESIA EA ADD 15 MINS: Performed by: INTERNAL MEDICINE

## 2017-01-01 PROCEDURE — 43239 EGD BIOPSY SINGLE/MULTIPLE: CPT | Performed by: INTERNAL MEDICINE

## 2017-01-01 PROCEDURE — G0379 DIRECT REFER HOSPITAL OBSERV: HCPCS

## 2017-01-01 PROCEDURE — 86900 BLOOD TYPING SEROLOGIC ABO: CPT

## 2017-01-01 PROCEDURE — 99283 EMERGENCY DEPT VISIT LOW MDM: CPT

## 2017-01-01 PROCEDURE — 27201012 HC FORCEPS, HOT/COLD, DISP: Performed by: INTERNAL MEDICINE

## 2017-01-01 PROCEDURE — 86850 RBC ANTIBODY SCREEN: CPT | Mod: 91

## 2017-01-01 PROCEDURE — 99232 SBSQ HOSP IP/OBS MODERATE 35: CPT | Mod: ,,, | Performed by: INTERNAL MEDICINE

## 2017-01-01 PROCEDURE — P9035 PLATELET PHERES LEUKOREDUCED: HCPCS

## 2017-01-01 PROCEDURE — 86850 RBC ANTIBODY SCREEN: CPT

## 2017-01-01 PROCEDURE — 1159F MED LIST DOCD IN RCRD: CPT | Mod: S$GLB,,, | Performed by: OTOLARYNGOLOGY

## 2017-01-01 PROCEDURE — 86022 PLATELET ANTIBODIES: CPT | Mod: 59

## 2017-01-01 PROCEDURE — 27100171 HC OXYGEN HIGH FLOW UP TO 24 HOURS

## 2017-01-01 PROCEDURE — 81000 URINALYSIS NONAUTO W/SCOPE: CPT

## 2017-01-01 PROCEDURE — 43246 EGD PLACE GASTROSTOMY TUBE: CPT | Mod: ,,, | Performed by: INTERNAL MEDICINE

## 2017-01-01 PROCEDURE — 25000003 PHARM REV CODE 250

## 2017-01-01 PROCEDURE — P9012 CRYOPRECIPITATE EACH UNIT: HCPCS

## 2017-01-01 PROCEDURE — 1160F RVW MEDS BY RX/DR IN RCRD: CPT | Mod: S$GLB,,, | Performed by: OTOLARYNGOLOGY

## 2017-01-01 PROCEDURE — 85007 BL SMEAR W/DIFF WBC COUNT: CPT

## 2017-01-01 PROCEDURE — 97802 MEDICAL NUTRITION INDIV IN: CPT

## 2017-01-01 PROCEDURE — 85397 CLOTTING FUNCT ACTIVITY: CPT

## 2017-01-01 PROCEDURE — 80069 RENAL FUNCTION PANEL: CPT

## 2017-01-01 PROCEDURE — 43239 EGD BIOPSY SINGLE/MULTIPLE: CPT | Mod: ,,, | Performed by: INTERNAL MEDICINE

## 2017-01-01 PROCEDURE — 78815 PET IMAGE W/CT SKULL-THIGH: CPT | Mod: 26,PS,, | Performed by: RADIOLOGY

## 2017-01-01 RX ORDER — CLOPIDOGREL BISULFATE 75 MG/1
75 TABLET ORAL DAILY
Status: DISCONTINUED | OUTPATIENT
Start: 2017-01-01 | End: 2017-01-01 | Stop reason: HOSPADM

## 2017-01-01 RX ORDER — SODIUM CHLORIDE 9 MG/ML
1000 INJECTION, SOLUTION INTRAVENOUS CONTINUOUS
Status: DISCONTINUED | OUTPATIENT
Start: 2017-01-01 | End: 2017-01-01

## 2017-01-01 RX ORDER — CLOPIDOGREL BISULFATE 75 MG/1
75 TABLET ORAL DAILY
Qty: 30 TABLET | Refills: 11 | Status: SHIPPED | OUTPATIENT
Start: 2017-01-01 | End: 2018-03-10

## 2017-01-01 RX ORDER — GLUCAGON 1 MG
1 KIT INJECTION
Status: DISCONTINUED | OUTPATIENT
Start: 2017-01-01 | End: 2017-01-01 | Stop reason: HOSPADM

## 2017-01-01 RX ORDER — PANTOPRAZOLE SODIUM 40 MG/10ML
40 INJECTION, POWDER, LYOPHILIZED, FOR SOLUTION INTRAVENOUS DAILY
Status: DISCONTINUED | OUTPATIENT
Start: 2017-01-01 | End: 2017-01-01 | Stop reason: HOSPADM

## 2017-01-01 RX ORDER — ACETAMINOPHEN 325 MG/1
650 TABLET ORAL EVERY 8 HOURS PRN
Status: DISCONTINUED | OUTPATIENT
Start: 2017-01-01 | End: 2017-01-01 | Stop reason: HOSPADM

## 2017-01-01 RX ORDER — HYDROCODONE BITARTRATE AND ACETAMINOPHEN 500; 5 MG/1; MG/1
TABLET ORAL
Status: DISCONTINUED | OUTPATIENT
Start: 2017-01-01 | End: 2017-01-01

## 2017-01-01 RX ORDER — HYDROMORPHONE HYDROCHLORIDE 2 MG/ML
0.2 INJECTION, SOLUTION INTRAMUSCULAR; INTRAVENOUS; SUBCUTANEOUS
Status: DISCONTINUED | OUTPATIENT
Start: 2017-01-01 | End: 2017-01-01

## 2017-01-01 RX ORDER — MORPHINE SULFATE 2 MG/ML
2 INJECTION, SOLUTION INTRAMUSCULAR; INTRAVENOUS EVERY 4 HOURS PRN
Status: DISCONTINUED | OUTPATIENT
Start: 2017-01-01 | End: 2017-01-01

## 2017-01-01 RX ORDER — LIDOCAINE HYDROCHLORIDE 20 MG/ML
INJECTION, SOLUTION EPIDURAL; INFILTRATION; INTRACAUDAL; PERINEURAL
Status: COMPLETED
Start: 2017-01-01 | End: 2017-01-01

## 2017-01-01 RX ORDER — IBUPROFEN 200 MG
24 TABLET ORAL
Status: DISCONTINUED | OUTPATIENT
Start: 2017-01-01 | End: 2017-01-01 | Stop reason: HOSPADM

## 2017-01-01 RX ORDER — PROMETHAZINE HYDROCHLORIDE 25 MG/1
TABLET ORAL
Status: ON HOLD | COMMUNITY
Start: 2017-01-01 | End: 2017-01-01

## 2017-01-01 RX ORDER — FAMOTIDINE 10 MG/ML
20 INJECTION INTRAVENOUS DAILY
Status: DISCONTINUED | OUTPATIENT
Start: 2017-01-01 | End: 2017-01-01 | Stop reason: HOSPADM

## 2017-01-01 RX ORDER — LEVOTHYROXINE SODIUM 50 UG/1
50 TABLET ORAL DAILY
Qty: 30 TABLET | Refills: 0 | Status: SHIPPED | OUTPATIENT
Start: 2017-01-01

## 2017-01-01 RX ORDER — IBUPROFEN 200 MG
16 TABLET ORAL
Status: DISCONTINUED | OUTPATIENT
Start: 2017-01-01 | End: 2017-01-01 | Stop reason: HOSPADM

## 2017-01-01 RX ORDER — ACETAMINOPHEN 325 MG/1
650 TABLET ORAL EVERY 6 HOURS PRN
Status: DISCONTINUED | OUTPATIENT
Start: 2017-01-01 | End: 2017-01-01 | Stop reason: HOSPADM

## 2017-01-01 RX ORDER — ONDANSETRON 8 MG/1
TABLET, ORALLY DISINTEGRATING ORAL
Status: ON HOLD | COMMUNITY
Start: 2017-01-01 | End: 2017-01-01

## 2017-01-01 RX ORDER — LORAZEPAM 2 MG/ML
0.5 INJECTION INTRAMUSCULAR EVERY 30 MIN PRN
Status: DISCONTINUED | OUTPATIENT
Start: 2017-01-01 | End: 2017-01-01 | Stop reason: HOSPADM

## 2017-01-01 RX ORDER — ONDANSETRON 2 MG/ML
4 INJECTION INTRAMUSCULAR; INTRAVENOUS EVERY 8 HOURS PRN
Status: DISCONTINUED | OUTPATIENT
Start: 2017-01-01 | End: 2017-01-01 | Stop reason: HOSPADM

## 2017-01-01 RX ORDER — CLOPIDOGREL BISULFATE 75 MG/1
75 TABLET ORAL DAILY
Status: DISCONTINUED | OUTPATIENT
Start: 2017-01-01 | End: 2017-01-01

## 2017-01-01 RX ORDER — HYDROCODONE BITARTRATE AND ACETAMINOPHEN 500; 5 MG/1; MG/1
TABLET ORAL
Status: DISCONTINUED | OUTPATIENT
Start: 2017-01-01 | End: 2017-01-01 | Stop reason: HOSPADM

## 2017-01-01 RX ORDER — LOSARTAN POTASSIUM 25 MG/1
25 TABLET ORAL DAILY
Status: DISCONTINUED | OUTPATIENT
Start: 2017-01-01 | End: 2017-01-01 | Stop reason: HOSPADM

## 2017-01-01 RX ORDER — HALOPERIDOL 5 MG/ML
2 INJECTION INTRAMUSCULAR EVERY 4 HOURS PRN
Status: DISCONTINUED | OUTPATIENT
Start: 2017-01-01 | End: 2017-01-01

## 2017-01-01 RX ORDER — PROPOFOL 10 MG/ML
INJECTION, EMULSION INTRAVENOUS
Status: COMPLETED
Start: 2017-01-01 | End: 2017-01-01

## 2017-01-01 RX ORDER — HALOPERIDOL 5 MG/ML
5 INJECTION INTRAMUSCULAR EVERY 5 MIN PRN
Status: DISCONTINUED | OUTPATIENT
Start: 2017-01-01 | End: 2017-01-01 | Stop reason: HOSPADM

## 2017-01-01 RX ORDER — FAMOTIDINE 10 MG/ML
20 INJECTION INTRAVENOUS EVERY 12 HOURS
Status: DISCONTINUED | OUTPATIENT
Start: 2017-01-01 | End: 2017-01-01

## 2017-01-01 RX ORDER — LEVOTHYROXINE SODIUM 50 UG/1
50 TABLET ORAL DAILY
Qty: 30 TABLET | Refills: 0 | Status: ON HOLD | OUTPATIENT
Start: 2017-01-01 | End: 2017-01-01

## 2017-01-01 RX ORDER — IPRATROPIUM BROMIDE AND ALBUTEROL SULFATE 2.5; .5 MG/3ML; MG/3ML
3 SOLUTION RESPIRATORY (INHALATION) EVERY 6 HOURS
Status: DISCONTINUED | OUTPATIENT
Start: 2017-01-01 | End: 2017-01-01 | Stop reason: HOSPADM

## 2017-01-01 RX ORDER — CEFAZOLIN SODIUM 1 G/50ML
1 SOLUTION INTRAVENOUS
Status: CANCELLED | OUTPATIENT
Start: 2017-01-01

## 2017-01-01 RX ORDER — HYDROMORPHONE HYDROCHLORIDE 2 MG/ML
INJECTION, SOLUTION INTRAMUSCULAR; INTRAVENOUS; SUBCUTANEOUS
Status: DISCONTINUED
Start: 2017-01-01 | End: 2017-01-01 | Stop reason: HOSPADM

## 2017-01-01 RX ORDER — SODIUM CHLORIDE 450 MG/100ML
INJECTION, SOLUTION INTRAVENOUS CONTINUOUS
Status: DISCONTINUED | OUTPATIENT
Start: 2017-01-01 | End: 2017-01-01 | Stop reason: HOSPADM

## 2017-01-01 RX ORDER — SODIUM CHLORIDE 9 MG/ML
INJECTION, SOLUTION INTRAVENOUS CONTINUOUS
Status: DISCONTINUED | OUTPATIENT
Start: 2017-01-01 | End: 2017-01-01 | Stop reason: HOSPADM

## 2017-01-01 RX ORDER — LEVOTHYROXINE SODIUM 50 UG/1
50 TABLET ORAL DAILY
Status: DISCONTINUED | OUTPATIENT
Start: 2017-01-01 | End: 2017-01-01 | Stop reason: HOSPADM

## 2017-01-01 RX ORDER — PROMETHAZINE HYDROCHLORIDE 12.5 MG/1
12.5 TABLET ORAL EVERY 6 HOURS PRN
Status: DISCONTINUED | OUTPATIENT
Start: 2017-01-01 | End: 2017-01-01 | Stop reason: HOSPADM

## 2017-01-01 RX ORDER — MUPIROCIN 20 MG/G
OINTMENT TOPICAL 3 TIMES DAILY
Status: DISCONTINUED | OUTPATIENT
Start: 2017-01-01 | End: 2017-01-01 | Stop reason: HOSPADM

## 2017-01-01 RX ORDER — CHLORHEXIDINE GLUCONATE ORAL RINSE 1.2 MG/ML
15 SOLUTION DENTAL 4 TIMES DAILY
Status: DISCONTINUED | OUTPATIENT
Start: 2017-01-01 | End: 2017-01-01 | Stop reason: HOSPADM

## 2017-01-01 RX ORDER — CEFAZOLIN SODIUM 1 G/50ML
1 SOLUTION INTRAVENOUS
Status: COMPLETED | OUTPATIENT
Start: 2017-01-01 | End: 2017-01-01

## 2017-01-01 RX ORDER — CLOPIDOGREL BISULFATE 75 MG/1
75 TABLET ORAL DAILY
Start: 2017-01-01 | End: 2017-01-01 | Stop reason: SDUPTHER

## 2017-01-01 RX ORDER — SULFAMETHOXAZOLE AND TRIMETHOPRIM 200; 40 MG/5ML; MG/5ML
8 SUSPENSION ORAL DAILY
Qty: 762 ML | Refills: 3 | Status: SHIPPED | OUTPATIENT
Start: 2017-01-01 | End: 2017-01-01

## 2017-01-01 RX ORDER — ZIPRASIDONE MESYLATE 20 MG/ML
10 INJECTION, POWDER, LYOPHILIZED, FOR SOLUTION INTRAMUSCULAR ONCE
Status: DISCONTINUED | OUTPATIENT
Start: 2017-01-01 | End: 2017-01-01

## 2017-01-01 RX ORDER — HYDROMORPHONE HYDROCHLORIDE 2 MG/ML
0.2 INJECTION, SOLUTION INTRAMUSCULAR; INTRAVENOUS; SUBCUTANEOUS
Status: DISCONTINUED | OUTPATIENT
Start: 2017-01-01 | End: 2017-01-01 | Stop reason: HOSPADM

## 2017-01-01 RX ORDER — ONDANSETRON 2 MG/ML
4 INJECTION INTRAMUSCULAR; INTRAVENOUS EVERY 12 HOURS PRN
Status: DISCONTINUED | OUTPATIENT
Start: 2017-01-01 | End: 2017-01-01 | Stop reason: HOSPADM

## 2017-01-01 RX ORDER — ACETAMINOPHEN 325 MG/1
650 TABLET ORAL EVERY 4 HOURS PRN
Status: DISCONTINUED | OUTPATIENT
Start: 2017-01-01 | End: 2017-01-01

## 2017-01-01 RX ORDER — ENOXAPARIN SODIUM 100 MG/ML
40 INJECTION SUBCUTANEOUS EVERY 24 HOURS
Status: DISCONTINUED | OUTPATIENT
Start: 2017-01-01 | End: 2017-01-01 | Stop reason: HOSPADM

## 2017-01-01 RX ORDER — LEVOTHYROXINE SODIUM 50 UG/1
50 TABLET ORAL
Status: DISCONTINUED | OUTPATIENT
Start: 2017-01-01 | End: 2017-01-01 | Stop reason: HOSPADM

## 2017-01-01 RX ORDER — CEPHALEXIN 125 MG/5ML
500 POWDER, FOR SUSPENSION ORAL EVERY 8 HOURS
Qty: 600 ML | Refills: 0 | Status: SHIPPED | OUTPATIENT
Start: 2017-01-01 | End: 2017-01-01

## 2017-01-01 RX ORDER — HYDROCODONE BITARTRATE AND ACETAMINOPHEN 5; 325 MG/1; MG/1
1 TABLET ORAL EVERY 4 HOURS PRN
Status: DISCONTINUED | OUTPATIENT
Start: 2017-01-01 | End: 2017-01-01 | Stop reason: HOSPADM

## 2017-01-01 RX ORDER — MUPIROCIN 20 MG/G
OINTMENT TOPICAL 2 TIMES DAILY
Qty: 30 G | Refills: 0 | Status: SHIPPED | OUTPATIENT
Start: 2017-01-01 | End: 2017-01-01

## 2017-01-01 RX ORDER — RAMELTEON 8 MG/1
8 TABLET ORAL NIGHTLY PRN
Status: DISCONTINUED | OUTPATIENT
Start: 2017-01-01 | End: 2017-01-01 | Stop reason: HOSPADM

## 2017-01-01 RX ORDER — PANTOPRAZOLE SODIUM 40 MG/1
40 TABLET, DELAYED RELEASE ORAL DAILY
Status: DISCONTINUED | OUTPATIENT
Start: 2017-01-01 | End: 2017-01-01

## 2017-01-01 RX ORDER — HYDROCODONE BITARTRATE AND ACETAMINOPHEN 7.5; 325 MG/15ML; MG/15ML
SOLUTION ORAL
Status: ON HOLD | COMMUNITY
Start: 2017-01-01 | End: 2017-01-01

## 2017-01-01 RX ADMIN — HALOPERIDOL LACTATE 2 MG: 5 INJECTION, SOLUTION INTRAMUSCULAR at 05:04

## 2017-01-01 RX ADMIN — ENOXAPARIN SODIUM 40 MG: 100 INJECTION SUBCUTANEOUS at 03:03

## 2017-01-01 RX ADMIN — CHLORHEXIDINE GLUCONATE 15 ML: 1.2 RINSE ORAL at 05:04

## 2017-01-01 RX ADMIN — FAMOTIDINE 20 MG: 10 INJECTION, SOLUTION INTRAVENOUS at 09:04

## 2017-01-01 RX ADMIN — PIPERACILLIN AND TAZOBACTAM 4.5 G: 4; .5 INJECTION, POWDER, LYOPHILIZED, FOR SOLUTION INTRAVENOUS; PARENTERAL at 05:04

## 2017-01-01 RX ADMIN — MUPIROCIN: 20 OINTMENT TOPICAL at 02:03

## 2017-01-01 RX ADMIN — PIPERACILLIN SODIUM AND TAZOBACTAM SODIUM 4.5 G: 4; .5 INJECTION, POWDER, LYOPHILIZED, FOR SOLUTION INTRAVENOUS at 01:03

## 2017-01-01 RX ADMIN — CEFTAROLINE FOSAMIL 400 MG: 400 POWDER, FOR SOLUTION INTRAVENOUS at 11:04

## 2017-01-01 RX ADMIN — PIPERACILLIN SODIUM AND TAZOBACTAM SODIUM 4.5 G: 4; .5 INJECTION, POWDER, LYOPHILIZED, FOR SOLUTION INTRAVENOUS at 05:03

## 2017-01-01 RX ADMIN — MUPIROCIN: 20 OINTMENT TOPICAL at 10:03

## 2017-01-01 RX ADMIN — HALOPERIDOL LACTATE 2 MG: 5 INJECTION, SOLUTION INTRAMUSCULAR at 06:04

## 2017-01-01 RX ADMIN — IPRATROPIUM BROMIDE AND ALBUTEROL SULFATE 3 ML: .5; 3 SOLUTION RESPIRATORY (INHALATION) at 07:04

## 2017-01-01 RX ADMIN — FAMOTIDINE 20 MG: 10 INJECTION, SOLUTION INTRAVENOUS at 10:04

## 2017-01-01 RX ADMIN — IPRATROPIUM BROMIDE AND ALBUTEROL SULFATE 3 ML: .5; 3 SOLUTION RESPIRATORY (INHALATION) at 12:04

## 2017-01-01 RX ADMIN — HALOPERIDOL LACTATE 2 MG: 5 INJECTION, SOLUTION INTRAMUSCULAR at 03:04

## 2017-01-01 RX ADMIN — VANCOMYCIN HYDROCHLORIDE 1250 MG: 1 INJECTION, POWDER, LYOPHILIZED, FOR SOLUTION INTRAVENOUS at 04:03

## 2017-01-01 RX ADMIN — SODIUM CHLORIDE 1000 ML: 0.9 INJECTION, SOLUTION INTRAVENOUS at 06:04

## 2017-01-01 RX ADMIN — SODIUM CHLORIDE 1000 ML: 0.9 INJECTION, SOLUTION INTRAVENOUS at 07:02

## 2017-01-01 RX ADMIN — HALOPERIDOL LACTATE 5 MG: 5 INJECTION, SOLUTION INTRAMUSCULAR at 03:04

## 2017-01-01 RX ADMIN — HALOPERIDOL LACTATE 5 MG: 5 INJECTION, SOLUTION INTRAMUSCULAR at 01:04

## 2017-01-01 RX ADMIN — MUPIROCIN: 20 OINTMENT TOPICAL at 01:03

## 2017-01-01 RX ADMIN — IPRATROPIUM BROMIDE AND ALBUTEROL SULFATE 3 ML: .5; 3 SOLUTION RESPIRATORY (INHALATION) at 01:04

## 2017-01-01 RX ADMIN — LEVOTHYROXINE SODIUM 50 MCG: 50 TABLET ORAL at 09:03

## 2017-01-01 RX ADMIN — AMPICILLIN AND SULBACTAM 1.5 G: 1; .5 INJECTION, POWDER, FOR SOLUTION INTRAVENOUS at 11:04

## 2017-01-01 RX ADMIN — FAMOTIDINE 20 MG: 10 INJECTION, SOLUTION INTRAVENOUS at 11:04

## 2017-01-01 RX ADMIN — CHLORHEXIDINE GLUCONATE 15 ML: 1.2 RINSE ORAL at 12:04

## 2017-01-01 RX ADMIN — PANTOPRAZOLE SODIUM 40 MG: 40 INJECTION, POWDER, FOR SOLUTION INTRAVENOUS at 09:03

## 2017-01-01 RX ADMIN — AMPICILLIN AND SULBACTAM 1.5 G: 1; .5 INJECTION, POWDER, FOR SOLUTION INTRAVENOUS at 12:04

## 2017-01-01 RX ADMIN — PIPERACILLIN SODIUM AND TAZOBACTAM SODIUM 4.5 G: 4; .5 INJECTION, POWDER, LYOPHILIZED, FOR SOLUTION INTRAVENOUS at 10:03

## 2017-01-01 RX ADMIN — PROPOFOL 50 MG: 10 INJECTION, EMULSION INTRAVENOUS at 08:02

## 2017-01-01 RX ADMIN — AMPICILLIN AND SULBACTAM 1.5 G: 1; .5 INJECTION, POWDER, FOR SOLUTION INTRAVENOUS at 07:04

## 2017-01-01 RX ADMIN — ENOXAPARIN SODIUM 40 MG: 100 INJECTION SUBCUTANEOUS at 12:03

## 2017-01-01 RX ADMIN — CHLORHEXIDINE GLUCONATE 15 ML: 1.2 RINSE ORAL at 07:04

## 2017-01-01 RX ADMIN — PROPOFOL 50 MG: 10 INJECTION, EMULSION INTRAVENOUS at 07:02

## 2017-01-01 RX ADMIN — HALOPERIDOL LACTATE 2 MG: 5 INJECTION, SOLUTION INTRAMUSCULAR at 11:04

## 2017-01-01 RX ADMIN — PANTOPRAZOLE SODIUM 40 MG: 40 INJECTION, POWDER, FOR SOLUTION INTRAVENOUS at 10:03

## 2017-01-01 RX ADMIN — MORPHINE SULFATE 2 MG: 2 INJECTION, SOLUTION INTRAMUSCULAR; INTRAVENOUS at 05:04

## 2017-01-01 RX ADMIN — CEFAZOLIN SODIUM 1 G: 1 SOLUTION INTRAVENOUS at 07:02

## 2017-01-01 RX ADMIN — SODIUM CHLORIDE 1000 ML: 0.9 INJECTION, SOLUTION INTRAVENOUS at 08:04

## 2017-01-01 RX ADMIN — VANCOMYCIN HYDROCHLORIDE 1000 MG: 1 INJECTION, POWDER, LYOPHILIZED, FOR SOLUTION INTRAVENOUS at 03:03

## 2017-01-01 RX ADMIN — SODIUM CHLORIDE: 0.45 INJECTION, SOLUTION INTRAVENOUS at 05:03

## 2017-01-01 RX ADMIN — LIDOCAINE HYDROCHLORIDE 100 MG: 20 INJECTION, SOLUTION EPIDURAL; INFILTRATION; INTRACAUDAL; PERINEURAL at 07:02

## 2017-01-01 RX ADMIN — SODIUM CHLORIDE 1000 ML: 0.9 INJECTION, SOLUTION INTRAVENOUS at 09:04

## 2017-01-01 RX ADMIN — MUPIROCIN: 20 OINTMENT TOPICAL at 05:03

## 2017-01-01 RX ADMIN — HYDROMORPHONE HYDROCHLORIDE 0.2 MG: 2 INJECTION INTRAMUSCULAR; INTRAVENOUS; SUBCUTANEOUS at 12:04

## 2017-01-01 RX ADMIN — CEFTAROLINE FOSAMIL 400 MG: 400 POWDER, FOR SOLUTION INTRAVENOUS at 01:04

## 2017-01-01 RX ADMIN — PIPERACILLIN SODIUM AND TAZOBACTAM SODIUM 4.5 G: 4; .5 INJECTION, POWDER, LYOPHILIZED, FOR SOLUTION INTRAVENOUS at 02:03

## 2017-01-01 RX ADMIN — PIPERACILLIN SODIUM AND TAZOBACTAM SODIUM 4.5 G: 4; .5 INJECTION, POWDER, LYOPHILIZED, FOR SOLUTION INTRAVENOUS at 06:03

## 2017-01-01 RX ADMIN — PIPERACILLIN SODIUM AND TAZOBACTAM SODIUM 4.5 G: 4; .5 INJECTION, POWDER, LYOPHILIZED, FOR SOLUTION INTRAVENOUS at 09:03

## 2017-01-01 RX ADMIN — FAMOTIDINE 20 MG: 10 INJECTION, SOLUTION INTRAVENOUS at 08:04

## 2017-01-04 PROBLEM — C09.9 SQUAMOUS CELL CARCINOMA OF TONSIL: Status: ACTIVE | Noted: 2017-01-01

## 2017-01-04 PROBLEM — C79.89: Status: ACTIVE | Noted: 2017-01-01

## 2017-01-04 NOTE — PROGRESS NOTES
Chief Complaint   Patient presents with    Follow-up         86 y.o. male presents with right neck swelling and recently found to have an enlarged right tonsil.He states that he never had any associated pain with the swelling. No odynophagia. Denies hemoptysis, fevers, or weight loss.    CT neck demonstrates a right palatine mass and cervical adenopathy. FNA positive for squamous cell carcinoma. PET/CT today demonstrates increased uptake in the right tonsil and bilateral lymph nodes. No evidence of metastatic disease.    Still without pain, but now having trouble sleeping.      Review of Systems     Constitutional: Negative for fatigue and unexpected weight change.   HENT: per HPI.  Eyes: Negative for visual disturbance.   Respiratory: Negative for shortness of breath, hemoptysis  Cardiovascular: Negative for chest pain and palpitations.   Genitourinary: Negative for dysuria and difficulty urinating.   Musculoskeletal: Negative for decreased ROM, back pain.   Skin: Negative for rash, sunburn, itching.   Neurological: Negative for dizziness and seizures.   Hematological: Negative for adenopathy. Does not bruise/bleed easily.   Psychiatric/Behavioral: Negative for agitation. The patient is not nervous/anxious.   Endocrine: Negative for rapid weight loss/weight gain, heat/cold intolerance.     Past Medical History   Diagnosis Date    Arthritis     Coronary artery disease 1998     Coronary Stent  X1    Depression     Full dentures     Hyperlipidemia      patient is intolerant to statins    Thyroid disease        Past Surgical History   Procedure Laterality Date    Coronary stent placement      Tonsillectomy      Colonoscopy N/A 2/15/2016     Procedure: COLONOSCOPY;  Surgeon: Von Salgado MD;  Location: [facility];  Service: Endoscopy;  Laterality: N/A;    Eye surgery       Bilateral catarac lens    Hern[facility] repair Bilateral 2014     [facility]    Colon surgery         family history includes Asthma in his  grandchild; Cancer in his daughter, grandchild, and son; Diabetes in his brother; No Known Problems in his mother; Stroke in his father; Stroke (age of onset: 84) in his brother. There is no history of Early death.    Pt  reports that he quit smoking about 46 years ago. He has never used smokeless tobacco. He reports that he drinks alcohol. He reports that he does not use illicit drugs.    Review of patient's allergies indicates:   Allergen Reactions    Niacin preparations Hives        Physical Exam    Vitals:    01/04/17 1003   BP: 109/63   Pulse: 63   Temp: (!) 95.6 °F (35.3 °C)     Body mass index is 26.88 kg/(m^2).    Physical Exam   Constitutional: He is oriented to person, place, and time. He appears well-developed and well-nourished. No distress.   HENT:   Head: Normocephalic and atraumatic.   Right Ear: Hearing, tympanic membrane, external ear and ear canal normal. Tympanic membrane mobility is normal. No middle ear effusion. No decreased hearing is noted.   Left Ear: Hearing, tympanic membrane, external ear and ear canal normal. Tympanic membrane mobility is normal.  No middle ear effusion. No decreased hearing is noted.   Nose: Nose normal.   Mouth/Throat: He has dentures. Abnormal dentition. Posterior oropharyngeal erythema (Right tonsil with enlargement and induration extending to BOT and lateral pharyngeal wall.) present.   Eyes: Conjunctivae, EOM and lids are normal. Pupils are equal, round, and reactive to light. Right eye exhibits no discharge. Left eye exhibits no discharge.   Neck: Trachea normal, normal range of motion and phonation normal. Neck supple. No tracheal tenderness present. No tracheal deviation, no edema and no erythema present. No thyroid mass and no thyromegaly present.   Cardiovascular: Normal heart sounds.    Pulmonary/Chest: Breath sounds normal. No stridor.   Abdominal: Soft.   Lymphadenopathy:     He has cervical adenopathy (Right level II 2cm node firm, mobile, NTTP).    Neurological: He is alert and oriented to person, place, and time.   Skin: Skin is warm and dry. No rash noted. He is not diaphoretic. No erythema. No pallor.   Psychiatric: He has a normal mood and affect.   Nursing note and vitals reviewed.    Studies Reviewed:  CT neck 12/15/16:  There is a large irregular soft tissue density mass originating in the pharyngeal space the right palatine tonsil and extending into the pre-vertebral space and parotid space anterior to the anterior cervical triangle.  Additional lymph nodes are identified in the anterior cervical triangle which measure up to 2 cm.  Finding is consistent with a neoplasm.  Similar mass is not seen on the left.  This study was not performed for carotid patency but there is noted to be atherosclerotic plaque and calcification of both carotid bifurcations.  There is flow in both internal carotid arteries to the carotid siphons.    The parotid and submandibular salivary glands appear of normal size and CT density.  The right submandibular gland is pushed slightly forward due to adenopathy and mass.  The larynx is mildly asymmetric.  The thyroid is small.       Impression       Large soft tissue tumor, right pharyngeal mass originating in the palatine tonsil and extending into the prevertebral and parotid spaces and anterior cervical triangle with additional adenopathy in the right anterior cervical triangle.  Finding is consistent with a neoplasm.         PET/CT 1/4/17:  There is a large right tonsillar mass SUV max 13.02.  There is significant right ipsilateral jugular chain adenopathy SUV max 14.07.  There is also a left contralateral lymph node involved SUV max 5.64.  There are coronary artery calcifications.  There is physiologic GI and  activity.  There is an abdominal aortic aneurysm.  There is aortic plaque and DJD.  There is physiologic liver, spleen, GI  and adrenal activity.  There is baseline DJD.  There is a hiatal hernia.  Bones are  nothing unusual.    Assessment     1. Right tonsillar squamous cell carcinoma    2. 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 Squamous cell carcinoma of tonsil    4. Secondary malignancy of neck          Plan  In summary, Mr. Luke is an 86 year old male with [id number] squamous cell carcinoma of the right tonsil with extension to the nasopharynx. Discussed treatment options. Given the extent of his disease, I would not recommend surgical management at this time. Will refer to Heme/Onc and Radiation Oncology on the [place]. Will contact Pathology for additional p16 testing on the FNA. All questions were answered and I will continue to check in with the family on his progress during treatment. They are to contact me with any further questions or concerns. He should return to see me after the completion of his treatment.    I spent 25 minutes with this patient and over half was dedicated to patient counseling and coordinating care.

## 2017-01-11 NOTE — TELEPHONE ENCOUNTER
Called and spoke with pt's wife, updated records to show daughter's cell and home number, Emely and Roger Beach is the daughter and son in law. This update was made by Ms Luke.

## 2017-01-11 NOTE — TELEPHONE ENCOUNTER
----- Message from Moreno Guerra sent at 1/11/2017 10:15 AM CST -----  Contact: Daughter- Linda  Michaela richardson, please call; [phone]

## 2017-01-25 NOTE — TELEPHONE ENCOUNTER
----- Message from Michelle Lara sent at 1/25/2017 10:21 AM CST -----  Contact: wife  Wife - Kami Luke [phone] is requesting a refill on patient's Levothyroxine 50mcg / [facility] in [place] (wife does not know the phone number)

## 2017-01-25 NOTE — TELEPHONE ENCOUNTER
Call placed to inform patient of refill of Levothyroxine and need for TSH labs. No answer received. Left message to call office.

## 2017-01-27 NOTE — TELEPHONE ENCOUNTER
Call placed to patient to inform him levothyroxine was refilled and need of labs. Spoke to patient's daughter Linda Millan per patient's verbal consent. Advised Mrs Millan patient needed TSH lab performed. Mrs Millan stated patient was starting Chemo and Radiation therapy next week and wasn't going to be able to schedule labs at this time. Mrs Millan stated after completion of above therapies patient would call and schedule this.

## 2017-02-07 NOTE — PROGRESS NOTES
"Subjective:       Patient ID: Brad Luke is a 86 y.o. male.    This is an established patient.      Chief Complaint: Dysphagia    HPI Comments: Patient seen for dysphagia, occurring with solid foods, frequency often/increasing, alleviated/exacerbated by nothing in particular, associated signs/symptoms including malnutrition, onset recent.  He has head and neck cancer and is undergoing XRT and chemotherapy.  PEG placement recommended for impending swallowing/nutritional difficulty related to cancer.  Of note, he had sigmoid colon cancer resected about a year ago and will be due soon for surveillance for this but this will need to be deferred until after above.  Most of the history is related by his son.      Review of Systems   Constitutional: Negative for chills, fatigue and fever.   HENT: Positive for trouble swallowing.    Respiratory: Negative for cough, shortness of breath and wheezing.    Cardiovascular: Negative for chest pain and palpitations.   Gastrointestinal: Negative for abdominal pain, constipation, diarrhea, nausea and vomiting.   All other systems reviewed and are negative.      Objective:         Vitals:    02/07/17 1417   BP: (!) 152/68   Pulse: 68   Weight: 77.9 kg (171 lb 11.8 oz)   Height: 5' 7.5" (1.715 m)       Physical Exam   Constitutional: He is oriented to person, place, and time. He appears well-developed and well-nourished.   HENT:   Head: Normocephalic and atraumatic.   Mouth/Throat: Oropharynx is clear and moist.   Eyes: Conjunctivae are normal. Pupils are equal, round, and reactive to light. No scleral icterus.   Neck: Normal range of motion. Neck supple. No thyromegaly present.   Cardiovascular: Normal rate and regular rhythm.    No murmur heard.  Pulmonary/Chest: Effort normal and breath sounds normal. He has no wheezes.   Abdominal: Soft. Bowel sounds are normal. He exhibits no distension. There is no tenderness. There is no rebound and no guarding.   Musculoskeletal: He " exhibits no edema.   Lymphadenopathy:     He has cervical adenopathy (right sided).   Neurological: He is alert and oriented to person, place, and time.   Vitals reviewed.        Lab Results   Component Value Date    WBC 7.58 12/07/2016    HGB 13.5 (L) 12/07/2016    HCT 41.4 12/07/2016    MCV 86 12/07/2016     12/07/2016       Further history was obtained from the patient's son who was present throughout the interview and states that he is currently being treated with radiation and chemotherapy for head and neck cancer and that rad onc has recommended expedited PEG tube placement because of impending swallowing difficulty related to XRT.  History is otherwise as above in the HPI.     Assessment:       1. Cancer of sigmoid colon    2. Squamous cell carcinoma of tonsil    3. Dysphagia, unspecified type        Plan:       1.  Schedule EGD with PEG placement.  Risks including infection were reviewed with patient's son, especially given the fact that he is on chemotherapy.  They understand.  2.  Continue current treatment. His son states that he is currently not taking Plavix.  3.  Will given prophylactic antibiotics on call to procedure.  4.  Surveillance colonoscopy for colon cancer will be due soon, but will need to defer for the time being until above therapy complete.  Will place reminder in system.  5.  Further recommendations to follow after above.

## 2017-02-08 NOTE — TELEPHONE ENCOUNTER
Patient daughter notified and understands patient needs labs drawn Monday morning before proceding with procedure on 2/16

## 2017-02-15 NOTE — TELEPHONE ENCOUNTER
----- Message from Iris Langston sent at 2/15/2017 12:08 PM CST -----  Contact:  call//[phone] /wife // marquis    Calling to  Speak to the  Nurse about  A  Kit    To  Clean   Feeding   Tube // please call

## 2017-02-16 PROBLEM — R63.30 FEEDING DIFFICULTIES: Status: ACTIVE | Noted: 2017-01-01

## 2017-02-16 NOTE — ANESTHESIA POSTPROCEDURE EVALUATION
"Anesthesia Post Evaluation    Patient: Brad Luke    Procedure(s) Performed: Procedure(s) (LRB):  ESOPHAGOGASTRODUODENOSCOPY (EGD) (N/A)  PEG TUBE PLACEMENT/REPLACEMENT (N/A)    Final Anesthesia Type: general  Patient location during evaluation: PACU  Patient participation: Yes- Able to Participate  Level of consciousness: awake and alert  Post-procedure vital signs: reviewed and stable  Pain management: adequate  Airway patency: patent  PONV status at discharge: No PONV  Anesthetic complications: no      Cardiovascular status: hemodynamically stable  Respiratory status: unassisted and room air  Hydration status: euvolemic  Follow-up not needed.        Visit Vitals    BP (!) 161/75    Pulse 65    Temp 36.6 °C (97.9 °F) (Oral)    Resp 18    Ht 5' 7" (1.702 m)    Wt 78 kg (172 lb)    SpO2 100%    BMI 26.94 kg/m2       Pain/Trinh Score: Pain Assessment Performed: Yes (2/16/2017  8:24 AM)  Presence of Pain: denies (2/16/2017  8:24 AM)  Pain Rating Prior to Med Admin: 1 (2/16/2017  8:24 AM)  Pain Rating Post Med Admin: 1 (2/16/2017  8:24 AM)  Trinh Score: 10 (2/16/2017  8:24 AM)      "

## 2017-02-16 NOTE — ANESTHESIA PREPROCEDURE EVALUATION
02/16/2017  Brad Luke is a 86 y.o., male.    [facility] Anesthesia Evaluation    I have reviewed the Patient Summary Reports.    I have reviewed the Nursing Notes.      Review of Systems  Anesthesia Hx:  No problems with previous Anesthesia    Social:  Former Smoker    Hematology/Oncology:  Hematology Normal   Oncology Normal     EENT/Dental:   Full dentures   Cardiovascular:   Hypertension, well controlled CAD  CABG/stent     Pulmonary:  Pulmonary Normal    Hepatic/GI:   GERD    Neurological:  Dementia    Endocrine:   Hypothyroidism    Psych:   Psychiatric History depression          Physical Exam  General:  Well nourished    Airway/Jaw/Neck:  AIRWAY FINDINGS: Normal      Eyes/Ears/Nose:  EYES/EARS/NOSE FINDINGS: Normal   Dental:  DENTAL FINDINGS: Normal   Chest/Lungs:  Chest/Lungs Findings: Clear to auscultation     Heart/Vascular:  Heart Findings: Rate: Normal  Rhythm: Regular Rhythm  Sounds: Normal  Heart murmur: negative Vascular Findings: Normal    Abdomen:  Abdomen Findings: Normal    Musculoskeletal:  Musculoskeletal Findings: Normal   Skin:  Skin Findings: Normal    Mental Status:  Mental Status Findings: Normal        Anesthesia Plan  Type of Anesthesia, risks & benefits discussed:  Anesthesia Type:  general  Patient's Preference:   Intra-op Monitoring Plan:   Intra-op Monitoring Plan Comments:   Post Op Pain Control Plan:   Post Op Pain Control Plan Comments:   Induction:   IV  Beta Blocker:  Patient is not currently on a Beta-Blocker (No further documentation required).       Informed Consent: Patient understands risks and agrees with Anesthesia plan.  Questions answered. Anesthesia consent signed with patient.  ASA Score: 3     Day of Surgery Review of History & Physical:    H&P update referred to the surgeon.         Ready For Surgery From Anesthesia Perspective.

## 2017-02-16 NOTE — TRANSFER OF CARE
"Anesthesia Transfer of Care Note    Patient: Brad Luke    Procedure(s) Performed: Procedure(s) (LRB):  ESOPHAGOGASTRODUODENOSCOPY (EGD) (N/A)  PEG TUBE PLACEMENT/REPLACEMENT (N/A)    Patient location: PACU    Anesthesia Type: general    Transport from OR: Transported from OR on room air with adequate spontaneous ventilation    Post pain: adequate analgesia    Post assessment: no apparent anesthetic complications and tolerated procedure well    Post vital signs: stable    Level of consciousness: awake    Nausea/Vomiting: no nausea/vomiting    Complications: none          Last vitals:   Visit Vitals    BP (!) 166/81 (BP Location: Left arm)    Pulse 105    Temp 36.6 °C (97.9 °F) (Oral)    Resp 20    Ht 5' 7" (1.702 m)    Wt 78 kg (172 lb)    SpO2 98%    BMI 26.94 kg/m2     "

## 2017-02-16 NOTE — DISCHARGE INSTRUCTIONS
Gastritis (Adult)    Gastritis is inflammation and irritation of the stomach lining. It can be present for a short time (acute) or be long lasting (chronic). Gastritis is often caused by infection with bacteria called H pylori. More than a third of people in the [place] have this bacteria in their bodies. In many cases, H pylori causes no problems or symptoms. In some people, though, the infection irritates the stomach lining and causes gastritis. Other causes of stomach irritation include drinking alcohol or taking pain-relieving medicines called NSAIDs (such as aspirin or ibuprofen).   Symptoms of gastritis can include:  · Abdominal pain or bloating  · Loss of appetite  · Nausea or vomiting  · Vomiting blood or having black stools  · Feeling more tired than usual  An inflamed and irritated stomach lining is more likely to develop a sore called an ulcer. To help prevent this, gastritis should be treated.  Home care  If needed, medicines may be prescribed. If you have H pylori infection, treating it will likely relieve your symptoms. Other changes can help reduce stomach irritation and help it heal.  · If you have been prescribed medicines for H pylori infection, take them as directed. Take all of the medicine until it is finished or your healthcare provider tells you to stop, even if you feel better.  · Your healthcare provider may recommend avoiding NSAIDs. If you take daily aspirin for your heart or other medical reasons, do not stop without talking to your healthcare provider first.  · Avoid drinking alcohol.  · Stop smoking. Smoking can irritate the stomach and delay healing. As much as possible, stay away from second hand smoke.  Follow-up care  Follow up with your healthcare provider, or as advised by our staff. Testing may be needed to check for inflammation or an ulcer.  When to seek medical advice  Call your healthcare provider for any of the following:  · Stomach pain that gets worse or moves to the lower  right abdomen (appendix area)  · Chest pain that appears or gets worse, or spreads to the back, neck, shoulder, or arm  · Frequent vomiting (cant keep down liquids)  · Blood in the stool or vomit (red or black in color)  · Feeling weak or dizzy  · Fever of 100.4ºF (38ºC) or higher, or as directed by your healthcare provider  Date Last Reviewed: 6/22/2015 © 2000-2016 [employer]. [address] [place] [address], [place] [address]. All rights reserved. This information is not intended as a substitute for professional medical care. Always follow your healthcare professional's instructions.      peg  Discharge Instructions: Caring for Your Gastrostomy Tube (G-Tube)  You have been discharged with a gastrostomy tube, or G-tube. The G-tube was inserted through your belly (abdominal) wall and into your stomach. The tube will provide you with food, fluids, and medicine. Your G-tube may move in and out slightly. If the tube comes out all the way in the first few weeks after placement, dont put it back in. Call your healthcare provider right away. Dont wait until the next day. This is important because the G-tube tract through the skin may close very quickly, often within 24 hours. After the first few weeks, if the tube comes out, ask your provider how to get a spare tube. Ask your provider how to replace it at home.   General guidelines for use  · Wash your hands thoroughly with soap and warm water before starting your feeding.  · During the feeding and for 1 hour after, sit in a chair or sit up in bed.  · Before feeding begins, check to see that your stomach is empty. You will need a syringe for the following steps:   ¨ Insert the tip of an empty syringe into the end of the G-tube.  ¨ Pull back on the syringe to withdraw contents of the stomach.  ¨ Dont begin the feeding if more than 100 mL remains from the previous feeding.  · Clean the area around the tube with mild soap and water.  · Pat the area dry during  bathing and as needed.  · Clean the area more often if it gets wet or is leaking some discharge (weeping).  · Keep the disk (flange) a few millimeters off the skin. This should leave just enough room for a gauze sponge. Pulling the flange too tightly can damage the skin. But leaving the flange too loose leads to leaking around the G-tube. Your healthcare team will go over these guidelines before you leave the hospital.     The name of my feeding supplement/formula is:  ___________________________________________     Amount per feeding:   ___________________________________________     Times per day:  ___________________________________________     Amount of water used to flush tube:  ___________________________________________   Gravity feeding method  · Fill the feeding bag with the prescribed amount of formula. Run the fluid to the end of the tube to clear out any air. Clamp the tube.  · Connect the end of the feeding bag tubing to the G-tube.  · Hang the bag at least 18 inches above the level of your G-tube.  · Open the clamp and allow the formula to flow into the G-tube.  · Follow with the prescribed amount of water.  · After each feeding, rinse the bag and tubing. Every 24 hours, wash the bag and tubing with soapy water and rinse thoroughly.  Pump feeding method  · Fill the feeding bag with the prescribed amount of formula. Run the fluid to the end of the tube to clear out any air. Clamp the tube.  · Connect the end of the feeding bag tubing to the G-tube. Set the pump rate of flow to the prescribed rate per hour.  · Open the clamp on the tubing; press the start button on your pump.  · When feeding is complete, disconnect the feeding set.  · Connect the tip of an empty syringe to the feeding tube and slowly push in the prescribed amount of water.  · After each feeding, rinse the bag and tubing. Every 24 hours, wash the bag and tubing with soapy water and rinse thoroughly.  Syringe feeding method  · Remove the  plunger from a syringe and connect the syringe to the G-tube.  · Hold the syringe upright and pour the formula into the syringe.  · Refill the syringe as the formula reaches the bottom of the syringe.  · Repeat the process until the prescribed amount of formula is given.  · Follow the feeding with the prescribed amount of water.  · After each feeding, rinse the bag and tubing. Every 24 hours, wash the bag and tubing with soapy water and rinse thoroughly.  When to call your provider  Call your healthcare provider right away if you have any of the following:  · The tube comes out   · The tube is blocked  · Vomiting  · Fever above 100.4°F (38.0°C)  · Diarrhea that lasts more than 2 days  · Signs of infection (redness, swelling, or warmth at the tube site)  · Drainage from the tube site   Date Last Reviewed: 8/1/2016  © 6460-0998 [employer]. [address] [place] [address]. All rights reserved. This information is not intended as a substitute for professional medical care. Always follow your healthcare professional's instructions.        Taking Medicine Through a Feeding Tube  You are going home with a feeding tube in place. If you normally take any medicines by mouth, you will need to take them through your feeding tube. You can make this easier by calling your pharmacist to see whether any of your medicines are available in liquid form. If they are, ask that your prescriptions be filled with liquid medicines.  You were shown how to care for your type of feeding tube in the hospital. If you did not receive an instruction sheet on caring for your tube, ask for one. This sheet provides general guidelines and steps to follow when taking medicine through a feeding tube.  Before you begin  Remember to:  · Use liquid medicines whenever possible.  · Tell all your healthcare providers that you take medicines through your tube.  · Don't crush or dissolve extended-release or enteric-coated medicines  unless directed by your healthcare provider.  · Dont mix medicines with feeding formula unless your healthcare provider says its OK.  · Flush your tube before, between, and after giving medicines to prevent the tube from getting clogged.  Gather your supplies  Wash your hands thoroughly with mild soap and warm water.    Here's what you will need:  · Measuring cup that is marked with mL or cc (these are the same thing)  · Measuring spoon or syringe marked with mL or cc  · 50 mL (cc) or larger syringe  · Bowl of tap water (2 cups or more)  Taking your medicine  Recommendations include the following:   · Check the placement of your feeding tube the way you were shown in the hospital.  · Prepare each medicine the way you were shown in the hospital.  · Be sure to use the correct port on the feeding tube for your medicines   · Take your medicines in the following order:  ¨ Liquid medicines first.  ¨ Medicines that need to be dissolved second.  ¨ Thick medicines last.  · Measure the prescribed amount of liquid medicine, or crush pills and dissolve powder in 15 mL (about 1 tablespoon) or more of warm water.  · Remove the plunger from the 50 mL syringe. Pour 30 mL of warm water into the syringe and flush your tube.  · Pour the medicine into the syringe. Do not use the syringe plunger to push the medicine into the tube. Let the medicine flow in slowly.  · Be sure to flush your tube with 5 mL (about 1 teaspoon) or more of warm water between all medicines.  · Take each medicine by itself. Never mix medicines together in the syringe.  · Flush the tube with 30 mL of warm water after all medicines have been given.  · Wait before restarting your tube feeding. Some medicines dont work when mixed with the feeding formula. Ask your healthcare provider how long you should wait to start feeding after taking medicines.  · Keep your tube clamped in between feedings.     When to call your healthcare provider  Call your healthcare  provider right away if you have any of the following:  · Coughing  · Trouble breathing during feeding, flushing, or giving medicine  · Tube that cant be unclogged  · Tube that falls out or difficulty telling if the tube is in your stomach  · Tube that is cracked or breaking down   · Diarrhea that lasts more than 3 loose stools  · Constipation that lasts more than 48 hours  · Nausea or vomiting  · Bloody or coffee-colored drainage through the tube  · Red, warm, or tender skin around the tube  · Sudden increase or decrease in the amount of drainage through the tube  · Sudden weight loss or gain (more than 2 pounds in 24 hours)  · Bloated or tight stomach  · Fever of 100.4°F (38°C) or higher, or as directed by your healthcare provider   Date Last Reviewed: 8/1/2016  © 8464-9196 [employer]. [address] [place] [address], [place] [address]. All rights reserved. This information is not intended as a substitute for professional medical care. Always follow your healthcare professional's instructions.

## 2017-02-16 NOTE — INTERVAL H&P NOTE
The patient has been examined and the H&P has been reviewed:    I concur with the findings and no changes have occurred since H&P was written.    Anesthesia/Surgery risks, benefits and alternative options discussed and understood by patient/family.          Active Hospital Problems    Diagnosis  POA    Feeding difficulties [R63.3]  Yes      Resolved Hospital Problems    Diagnosis Date Resolved POA   No resolved problems to display.

## 2017-02-16 NOTE — PROGRESS NOTES
[facility]  Adult Nutrition  Progress Note    SUMMARY     Recommendations    Recommendation/Intervention: Recommend Isosource 1.5 bolus feedings  Start with bolus feedings 4 times a day with 1 250 mls carton per feeding with 120 mls (1/2 cup) sterile water after each feeding.    Progress per pt tolerance to  goal of 4 times daily. 0800, 1200, 1600, 2000.  Flush with 200 mls sterile water after each feeding for hydration.   Monitor residuals and hold if greater than 250 mls or per MD recs.  Pt to sit at 30-45 degree angle after feeding to prevent aspiration.    Goals: 1) % of pt's nutrient needs will be met 2) Pt will tolerate TF at goal rate.    Nutrition Goal Status: new     Continuum of Care Plan    Referral to Outpatient Services: weight management (Suggest regular weight checks to ensure pt is maintaining wt)    Reason for Assessment    Reason for Assessment: physician consult      General Information Comments: Admitted for PEG placement.  Consult for TF recs.  Patient's wife and daughter report patient is scheduled for another outpatient service at another facility today where he will receive their recommendation for tube feedings.  Instructed patient's family on need to ask regarding flushes for hydration.   Deferred TF rec instruction to facility providing pt's tube feeding formula.    Nutrition Prescription Ordered        Nutrition/Diet History       Pt is eating and drinking PO currently. Drinks water and Gatorade daily.     Labs/Tests/Procedures/Meds    Diagnostic Test/Procedure Review: reviewed, pertinent PEG placed   Pertinent Labs Reviewed: reviewed, pertinent      Lab Results   Component Value Date    ALBUMIN 3.8 04/16/2016     BMP  Lab Results   Component Value Date     04/16/2016    K 4.3 04/16/2016     04/16/2016    CO2 26 04/16/2016    BUN 13 04/16/2016    CREATININE 1.2 12/13/2016    CALCIUM 9.2 04/16/2016    ANIONGAP 9 04/16/2016    ESTGFRAFRICA >60.0  2016    EGFRNONAA 54.4 (A) 2016     Pertinent Medications Reviewed: reviewed, pertinent      Scheduled Meds:   Continuous Infusions:   sodium chloride 0.9%       PRN Meds:.      Physical Findings     Oral/Mouth Cavity: dental applicance present (specify) (full dentures)     Anthropometrics    Height Method: Stated  Height (inches): 67.01 in   Weight (kg): 78 kg  Ideal Body Weight (IBW), Male: 148.06 lb   % Ideal Body Weight, Male (lb): 116.14 lb   BMI (kg/m2): 26.93  BMI Grade: 25 - 29.9 - overweight  Usual Body Weight (UBW), k.3 kg (per chart review of MD office visit 16)  % Usual Body Weight: 95.94  % Weight Change: 4 kg  Weight Loss: unintentional     Anthropometrics (Special Considerations)       Estimated/Assessed Needs    Weight Used For Calorie Calculations: 78 kg (171 lb 15.3 oz)   Height (cm): 170.2 cm   Energy Need Method: [place] Jeor (5251-3254 including AF of 1.2-1.3 for weight maintenance)   RMR ([place]-St. Jeor Equation): 1424.85   Weight Used For Protein Calculations: 78 kg (171 lb 15.3 oz)  Protein Requirements:  1.0 gm Protein (gm): 78.16 and 1.2 gm Protein (gm): 93.8  Fluid Need Method:  (1 ml/kcal or per MD recs)   Nutrition Follow-Up    RD Follow-up?: No

## 2017-02-16 NOTE — IP AVS SNAPSHOT
[address] [place] [address]  Phone: [phone]           Patient Discharge Instructions     Our goal is to set you up for success. This packet includes information on your condition, medications, and your home care. It will help you to care for yourself so you don't get sicker and need to go back to the hospital.     Please ask your nurse if you have any questions.        There are many details to remember when preparing to leave the hospital. Here is what you will need to do:    1. Take your medicine. If you are prescribed medications, review your Medication List in the following pages. You may have new medications to  at the pharmacy and others that you'll need to stop taking. Review the instructions for how and when to take your medications. Talk with your doctor or nurses if you are unsure of what to do.     2. Go to your follow-up appointments. Specific follow-up information is listed in the following pages. Your may be contacted by a transition nurse or clinical provider about future appointments. Be sure we have all of the phone numbers to reach you, if needed. Please contact your provider's office if you are unable to make an appointment.     3. Watch for warning signs. Your doctor or nurse will give you detailed warning signs to watch for and when to call for assistance. These instructions may also include educational information about your condition. If you experience any of warning signs to your health, call your doctor.               [facility]  Unless otherwise directed by your provider, please contact [facility], our nurse care line that is available for 24/7 assistance.     [phone] (toll-free)    Registered nurses in the [facility] provide clinical advisement, health education, appointment booking, and other advisory services.                    ** Verify the list of medication(s) below is accurate and up to date.  Carry this with you in case of emergency. If your medications have changed, please notify your healthcare provider.             Medication List      CONTINUE taking these medications        Additional Info                      * clopidogrel 75 mg tablet   Commonly known as:  PLAVIX   Refills:  0   Dose:  75 mg    Instructions:  Take 75 mg by mouth once daily.     Begin Date    AM    Noon    PM    Bedtime       * clopidogrel 75 mg tablet   Commonly known as:  PLAVIX   Quantity:  30 tablet   Refills:  0    Instructions:  TAKE ONE TABLET BY MOUTH ONCE DAILY     Begin Date    AM    Noon    PM    Bedtime       levothyroxine 50 MCG tablet   Commonly known as:  SYNTHROID   Quantity:  30 tablet   Refills:  0   Dose:  50 mcg   Indications:  hypothyroidism   Comments:  [place]    Instructions:  Take 1 tablet (50 mcg total) by mouth once daily.     Begin Date    AM    Noon    PM    Bedtime       losartan 25 MG tablet   Commonly known as:  COZAAR   Refills:  0   Dose:  1 tablet    Instructions:  Take 1 tablet by mouth once daily.     Begin Date    AM    Noon    PM    Bedtime       ondansetron 8 MG Tbdl   Commonly known as:  ZOFRAN-ODT   Refills:  0      Begin Date    AM    Noon    PM    Bedtime       pantoprazole 40 MG tablet   Commonly known as:  PROTONIX   Refills:  0      Begin Date    AM    Noon    PM    Bedtime       promethazine 25 MG tablet   Commonly known as:  PHENERGAN   Refills:  0      Begin Date    AM    Noon    PM    Bedtime       * Notice:  This list has 2 medication(s) that are the same as other medications prescribed for you. Read the directions carefully, and ask your doctor or other care provider to review them with you.               Please bring to all follow up appointments:    1. A copy of your discharge instructions.  2. All medicines you are currently taking in their original bottles.  3. Identification and insurance card.    Please arrive 15 minutes ahead of scheduled appointment time.    Please call 24  hours in advance if you must reschedule your appointment and/or time.        Follow-up Information     Follow up with Von Salgado MD In 2 weeks.    Specialty:  Gastroenterology    Contact information:    [address]  [address]  [place] [id number] [phone]          Discharge Instructions     Future Orders    Activity as tolerated     Diet general     Questions:    Total calories:      Fat restriction, if any:      Protein restriction, if any:      Na restriction, if any:      Fluid restriction:      Additional restrictions:          Discharge Instructions         Gastritis (Adult)    Gastritis is inflammation and irritation of the stomach lining. It can be present for a short time (acute) or be long lasting (chronic). Gastritis is often caused by infection with bacteria called H pylori. More than a third of people in the [place] have this bacteria in their bodies. In many cases, H pylori causes no problems or symptoms. In some people, though, the infection irritates the stomach lining and causes gastritis. Other causes of stomach irritation include drinking alcohol or taking pain-relieving medicines called NSAIDs (such as aspirin or ibuprofen).   Symptoms of gastritis can include:  · Abdominal pain or bloating  · Loss of appetite  · Nausea or vomiting  · Vomiting blood or having black stools  · Feeling more tired than usual  An inflamed and irritated stomach lining is more likely to develop a sore called an ulcer. To help prevent this, gastritis should be treated.  Home care  If needed, medicines may be prescribed. If you have H pylori infection, treating it will likely relieve your symptoms. Other changes can help reduce stomach irritation and help it heal.  · If you have been prescribed medicines for H pylori infection, take them as directed. Take all of the medicine until it is finished or your healthcare provider tells you to stop, even if you feel better.  · Your healthcare provider may recommend avoiding  NSAIDs. If you take daily aspirin for your heart or other medical reasons, do not stop without talking to your healthcare provider first.  · Avoid drinking alcohol.  · Stop smoking. Smoking can irritate the stomach and delay healing. As much as possible, stay away from second hand smoke.  Follow-up care  Follow up with your healthcare provider, or as advised by our staff. Testing may be needed to check for inflammation or an ulcer.  When to seek medical advice  Call your healthcare provider for any of the following:  · Stomach pain that gets worse or moves to the lower right abdomen (appendix area)  · Chest pain that appears or gets worse, or spreads to the back, neck, shoulder, or arm  · Frequent vomiting (cant keep down liquids)  · Blood in the stool or vomit (red or black in color)  · Feeling weak or dizzy  · Fever of 100.4ºF (38ºC) or higher, or as directed by your healthcare provider  Date Last Reviewed: 6/22/2015  © 6628-7666 [employer]. [address] [place] [address], [place] [address]. All rights reserved. This information is not intended as a substitute for professional medical care. Always follow your healthcare professional's instructions.      peg  Discharge Instructions: Caring for Your Gastrostomy Tube (G-Tube)  You have been discharged with a gastrostomy tube, or G-tube. The G-tube was inserted through your belly (abdominal) wall and into your stomach. The tube will provide you with food, fluids, and medicine. Your G-tube may move in and out slightly. If the tube comes out all the way in the first few weeks after placement, dont put it back in. Call your healthcare provider right away. Dont wait until the next day. This is important because the G-tube tract through the skin may close very quickly, often within 24 hours. After the first few weeks, if the tube comes out, ask your provider how to get a spare tube. Ask your provider how to replace it at home.   General guidelines for use  · Wash  your hands thoroughly with soap and warm water before starting your feeding.  · During the feeding and for 1 hour after, sit in a chair or sit up in bed.  · Before feeding begins, check to see that your stomach is empty. You will need a syringe for the following steps:   ¨ Insert the tip of an empty syringe into the end of the G-tube.  ¨ Pull back on the syringe to withdraw contents of the stomach.  ¨ Dont begin the feeding if more than 100 mL remains from the previous feeding.  · Clean the area around the tube with mild soap and water.  · Pat the area dry during bathing and as needed.  · Clean the area more often if it gets wet or is leaking some discharge (weeping).  · Keep the disk (flange) a few millimeters off the skin. This should leave just enough room for a gauze sponge. Pulling the flange too tightly can damage the skin. But leaving the flange too loose leads to leaking around the G-tube. Your healthcare team will go over these guidelines before you leave the hospital.     The name of my feeding supplement/formula is:  ___________________________________________     Amount per feeding:   ___________________________________________     Times per day:  ___________________________________________     Amount of water used to flush tube:  ___________________________________________   Gravity feeding method  · Fill the feeding bag with the prescribed amount of formula. Run the fluid to the end of the tube to clear out any air. Clamp the tube.  · Connect the end of the feeding bag tubing to the G-tube.  · Hang the bag at least 18 inches above the level of your G-tube.  · Open the clamp and allow the formula to flow into the G-tube.  · Follow with the prescribed amount of water.  · After each feeding, rinse the bag and tubing. Every 24 hours, wash the bag and tubing with soapy water and rinse thoroughly.  Pump feeding method  · Fill the feeding bag with the prescribed amount of formula. Run the fluid to the end of  the tube to clear out any air. Clamp the tube.  · Connect the end of the feeding bag tubing to the G-tube. Set the pump rate of flow to the prescribed rate per hour.  · Open the clamp on the tubing; press the start button on your pump.  · When feeding is complete, disconnect the feeding set.  · Connect the tip of an empty syringe to the feeding tube and slowly push in the prescribed amount of water.  · After each feeding, rinse the bag and tubing. Every 24 hours, wash the bag and tubing with soapy water and rinse thoroughly.  Syringe feeding method  · Remove the plunger from a syringe and connect the syringe to the G-tube.  · Hold the syringe upright and pour the formula into the syringe.  · Refill the syringe as the formula reaches the bottom of the syringe.  · Repeat the process until the prescribed amount of formula is given.  · Follow the feeding with the prescribed amount of water.  · After each feeding, rinse the bag and tubing. Every 24 hours, wash the bag and tubing with soapy water and rinse thoroughly.  When to call your provider  Call your healthcare provider right away if you have any of the following:  · The tube comes out   · The tube is blocked  · Vomiting  · Fever above 100.4°F (38.0°C)  · Diarrhea that lasts more than 2 days  · Signs of infection (redness, swelling, or warmth at the tube site)  · Drainage from the tube site   Date Last Reviewed: 8/1/2016  © 3110-5590 [employer]. [address] [place] [address]. All rights reserved. This information is not intended as a substitute for professional medical care. Always follow your healthcare professional's instructions.        Taking Medicine Through a Feeding Tube  You are going home with a feeding tube in place. If you normally take any medicines by mouth, you will need to take them through your feeding tube. You can make this easier by calling your pharmacist to see whether any of your medicines are available in liquid  form. If they are, ask that your prescriptions be filled with liquid medicines.  You were shown how to care for your type of feeding tube in the hospital. If you did not receive an instruction sheet on caring for your tube, ask for one. This sheet provides general guidelines and steps to follow when taking medicine through a feeding tube.  Before you begin  Remember to:  · Use liquid medicines whenever possible.  · Tell all your healthcare providers that you take medicines through your tube.  · Don't crush or dissolve extended-release or enteric-coated medicines unless directed by your healthcare provider.  · Dont mix medicines with feeding formula unless your healthcare provider says its OK.  · Flush your tube before, between, and after giving medicines to prevent the tube from getting clogged.  Gather your supplies  Wash your hands thoroughly with mild soap and warm water.    Here's what you will need:  · Measuring cup that is marked with mL or cc (these are the same thing)  · Measuring spoon or syringe marked with mL or cc  · 50 mL (cc) or larger syringe  · Bowl of tap water (2 cups or more)  Taking your medicine  Recommendations include the following:   · Check the placement of your feeding tube the way you were shown in the hospital.  · Prepare each medicine the way you were shown in the hospital.  · Be sure to use the correct port on the feeding tube for your medicines   · Take your medicines in the following order:  ¨ Liquid medicines first.  ¨ Medicines that need to be dissolved second.  ¨ Thick medicines last.  · Measure the prescribed amount of liquid medicine, or crush pills and dissolve powder in 15 mL (about 1 tablespoon) or more of warm water.  · Remove the plunger from the 50 mL syringe. Pour 30 mL of warm water into the syringe and flush your tube.  · Pour the medicine into the syringe. Do not use the syringe plunger to push the medicine into the tube. Let the medicine flow in slowly.  · Be sure to  flush your tube with 5 mL (about 1 teaspoon) or more of warm water between all medicines.  · Take each medicine by itself. Never mix medicines together in the syringe.  · Flush the tube with 30 mL of warm water after all medicines have been given.  · Wait before restarting your tube feeding. Some medicines dont work when mixed with the feeding formula. Ask your healthcare provider how long you should wait to start feeding after taking medicines.  · Keep your tube clamped in between feedings.     When to call your healthcare provider  Call your healthcare provider right away if you have any of the following:  · Coughing  · Trouble breathing during feeding, flushing, or giving medicine  · Tube that cant be unclogged  · Tube that falls out or difficulty telling if the tube is in your stomach  · Tube that is cracked or breaking down   · Diarrhea that lasts more than 3 loose stools  · Constipation that lasts more than 48 hours  · Nausea or vomiting  · Bloody or coffee-colored drainage through the tube  · Red, warm, or tender skin around the tube  · Sudden increase or decrease in the amount of drainage through the tube  · Sudden weight loss or gain (more than 2 pounds in 24 hours)  · Bloated or tight stomach  · Fever of 100.4°F (38°C) or higher, or as directed by your healthcare provider   Date Last Reviewed: 8/1/2016  © 4914-5395 [employer]. [address] [place] [address]. All rights reserved. This information is not intended as a substitute for professional medical care. Always follow your healthcare professional's instructions.            Admission Information     Date & Time Provider Department CSN    2/16/2017  6:20 AM Von Salgado MD [facility] [id number]      Care Providers     Provider Role Specialty Primary office phone    Von Salgado MD Attending Provider Gastroenterology [phone]    Von Salgado MD Surgeon  Gastroenterology [phone]      Your  "Vitals Were     BP Pulse Temp Resp Height Weight    161/75 65 97.9 °F (36.6 °C) (Oral) 18 5' 7" (1.702 m) 78 kg (172 lb)    SpO2 BMI             100% 26.94 kg/m2         Recent Lab Values     No lab values to display.      Allergies as of 2/16/2017        Reactions    Niacin Preparations Hives      Advance Directives     An advance directive is a document which, in the event you are no longer able to make decisions for yourself, tells your healthcare team what kind of treatment you do or do not want to receive, or who you would like to make those decisions for you.  If you do not currently have an advance directive, [facility] encourages you to create one.  For more information call:  [phone]), [phone] ([phone]),  or log on to [email or web address].        Smoking Cessation     If you would like to quit smoking:   You may be eligible for free services if you are a [place] resident and started smoking cigarettes before September 1, 1988.  Call the Smoking Cessation [employer] (SCT) toll free at (078) 503-3250 or (303) 646-8955.   Call 0-247-QUIT-NOW if you do not meet the above criteria.            Language Assistance Services     ATTENTION: Language assistance services are available, free of charge. Please call [phone].      ATENCIÓN: Si habla español, tiene a brock disposición servicios gratuitos de asistencia lingüística. Llame al [phone].     CHÚ Ý: N?u b?n nói Ti?ng Vi?t, có các d?ch v? h? tr? ngôn ng? mi?n phí dành cho b?n. G?i s? [phone].        Chronic Kindey Disease Education              [facility] complies with applicable Federal civil rights laws and does not discriminate on the basis of race, color, national origin, age, disability, or sex.        "

## 2017-03-01 PROBLEM — L03.90 CELLULITIS: Status: ACTIVE | Noted: 2017-01-01

## 2017-03-01 PROBLEM — C76.0 HEAD AND NECK CANCER: Status: ACTIVE | Noted: 2017-01-01

## 2017-03-01 NOTE — CONSULTS
Brad Luke [id number] is a 86 y.o. male who has been consulted for vancomycin dosing.    The patient has the following labs:     Date Creatinine (mg/dl)    BUN WBC Count   3/1/2017 CrCl cannot be calculated (Patient has no serum creatinine result on file.). Lab Results   Component Value Date    BUN 13 04/16/2016     Lab Results   Component Value Date    WBC 9.90 02/13/2017        Current weight is 76.2 kg (168 lb)        The patient will be started on vancomycin at a dose of 1250 mg every 24 hours.  The vancomycin trough has been ordered for 03/03 at 1600.      Patient will be followed by pharmacy for changes in renal function, toxicity, and efficacy.   Thank you for allowing us to participate in this patient's care.     Yajaira Polanco

## 2017-03-01 NOTE — IP AVS SNAPSHOT
[address] [place] [address]  Phone: [phone]           Patient Discharge Instructions     Our goal is to set you up for success. This packet includes information on your condition, medications, and your home care. It will help you to care for yourself so you don't get sicker and need to go back to the hospital.     Please ask your nurse if you have any questions.        There are many details to remember when preparing to leave the hospital. Here is what you will need to do:    1. Take your medicine. If you are prescribed medications, review your Medication List in the following pages. You may have new medications to  at the pharmacy and others that you'll need to stop taking. Review the instructions for how and when to take your medications. Talk with your doctor or nurses if you are unsure of what to do.     2. Go to your follow-up appointments. Specific follow-up information is listed in the following pages. Your may be contacted by a transition nurse or clinical provider about future appointments. Be sure we have all of the phone numbers to reach you, if needed. Please contact your provider's office if you are unable to make an appointment.     3. Watch for warning signs. Your doctor or nurse will give you detailed warning signs to watch for and when to call for assistance. These instructions may also include educational information about your condition. If you experience any of warning signs to your health, call your doctor.               ** Verify the list of medication(s) below is accurate and up to date. Carry this with you in case of emergency. If your medications have changed, please notify your healthcare provider.             Medication List      START taking these medications        Additional Info    Begin Date AM Noon PM Bedtime    cephALEXin 125 mg/5 mL Susr   Commonly known as:  KEFLEX   Quantity:  600 mL   Refills:  0   Dose:  500 mg     Instructions:  Take 20 mLs (500 mg total) by mouth every 8 (eight) hours.                               mupirocin 2 % ointment   Commonly known as:  BACTROBAN   Quantity:  30 g   Refills:  0    Last time this was given:  3/3/2017  2:01 PM   Instructions:  Apply topically 2 (two) times daily. Apply to PEG site rash                                 CHANGE how you take these medications        Additional Info    Begin Date AM Noon PM Bedtime    clopidogrel 75 mg tablet   Commonly known as:  PLAVIX   Refills:  0   Dose:  75 mg   What changed:  Another medication with the same name was removed. Continue taking this medication, and follow the directions you see here.    Instructions:  Take 1 tablet (75 mg total) by mouth once daily.                                 CONTINUE taking these medications        Additional Info    Begin Date AM Noon PM Bedtime    levothyroxine 50 MCG tablet   Commonly known as:  SYNTHROID   Quantity:  30 tablet   Refills:  0   Dose:  50 mcg   Indications:  hypothyroidism   Comments:  carlton    Last time this was given:  50 mcg on 3/3/2017  9:52 AM   Instructions:  Take 1 tablet (50 mcg total) by mouth once daily.                               losartan 25 MG tablet   Commonly known as:  COZAAR   Refills:  0   Dose:  1 tablet   Indications:  per wife patient only takes as needed for high BP    Instructions:  Take 1 tablet by mouth nightly as needed.                               ondansetron 8 MG Tbdl   Commonly known as:  ZOFRAN-ODT   Refills:  0                             pantoprazole 40 MG tablet   Commonly known as:  PROTONIX   Refills:  0                             promethazine 25 MG tablet   Commonly known as:  PHENERGAN   Refills:  0                                  Where to Get Your Medications      You can get these medications from any pharmacy     Bring a paper prescription for each of these medications     cephALEXin 125 mg/5 mL Susr    levothyroxine 50 MCG tablet    mupirocin 2 % ointment          Information about where to get these medications is not yet available     ! Ask your nurse or doctor about these medications     clopidogrel 75 mg tablet                  Please bring to all follow up appointments:    1. A copy of your discharge instructions.  2. All medicines you are currently taking in their original bottles.  3. Identification and insurance card.    Please arrive 15 minutes ahead of scheduled appointment time.    Please call 24 hours in advance if you must reschedule your appointment and/or time.        Follow-up Information     Follow up with [facility]    Specialties:  Home Health Services, Physical Therapy, Occupational Therapy    Why:  Home Health    Contact information:    [facility] [address]  [address]  [place] [address]  [phone]          Follow up with Vikas Carbajal MD In 1 week.    Specialty:  Family Medicine    Contact information:    [address] [facility] [address]  [phone]          Please follow up.    Contact information:    Follow up on Monday 03-06-17 for radiation treatment. Your chemotherapy is scheduled for next week.      Referrals     Future Orders    Ambulatory referral to Home Health         Discharge Instructions     Future Orders    Call MD for:     Comments:    For worsening symptoms, chest pain, shortness of breath, increased abdominal pain, high grade fever, stroke or stroke like symptoms, immediately go to the nearest Emergency Room or call 911 as soon as possible.    Diet general     Comments:    PEG feeding: Isosource 1.5 can 5 times daily as before.    Questions:    Total calories:      Fat restriction, if any:      Protein restriction, if any:      Na restriction, if any:      Fluid restriction:      Additional restrictions:      Other restrictions (specify):     Comments:    Fall precautions        Discharge Instructions       Resume radiation and Erbitux 3/6 for treatment of tonsillar cancer      Primary Diagnosis      "Your primary diagnosis was:  Bacterial Skin Infection      Admission Information     Date & Time Provider Department CSN    3/1/2017  2:30 PM Khushboo Gray MD [facility] [id number]      Care Providers     Provider Role Specialty Primary office phone    Khushboo Gray MD Attending Provider Internal Medicine [phone]    Oz Bryson MD Consulting Physician  Hematology and Oncology [phone]      Your Vitals Were     BP Pulse Temp Resp Height Weight    111/56 (BP Location: Right arm, Patient Position: Lying, BP Method: Automatic) 63 99.2 °F (37.3 °C) (Oral) 18 5' 7" (1.702 m) 76.2 kg (168 lb)    SpO2 BMI             99% 26.31 kg/m2         Recent Lab Values     No lab values to display.      Allergies as of 3/3/2017        Reactions    Niacin Preparations Hives      [facility]     [facility] Nurse Care Line - 24/7 Assistance  Unless otherwise directed by your provider, please contact [facility], our nurse care line that is available for 24/7 assistance.     Registered nurses in the [facility] provide clinical advisement, health education, appointment booking, and other advisory services.  Call for this free service at [phone].        Advance Directives     An advance directive is a document which, in the event you are no longer able to make decisions for yourself, tells your healthcare team what kind of treatment you do or do not want to receive, or who you would like to make those decisions for you.  If you do not currently have an advance directive, [facility] encourages you to create one.  For more information call:  [phone]), [phone] ([phone]),  or log on to [email or web address].        Smoking Cessation     If you would like to quit smoking:   You may be eligible for free services if you are a [place] resident and started smoking cigarettes before September 1, 1988.  Call the Smoking Cessation Trust (SCT) toll free at " (849) 256-3465 or (829) 073-3332.   Call 1-800-QUIT-NOW if you do not meet the above criteria.            Language Assistance Services     ATTENTION: Language assistance services are available, free of charge. Please call [phone].      ATENCIÓN: Si habla alirioañol, tiene a brock disposición servicios gratuitos de asistencia lingüística. Llame al [phone].     CHÚ Ý: N?u b?n nói Ti?ng Vi?t, có các d?ch v? h? tr? ngôn ng? mi?n phí dành cho b?n. G?i s? [phone].        Chronic [place]ey Disease Education              [facility] complies with applicable Federal civil rights laws and does not discriminate on the basis of race, color, national origin, age, disability, or sex.

## 2017-03-01 NOTE — PROGRESS NOTES
Subjective:       Patient ID: Brad Luke is a 86 y.o. male.    This is an established patient.      Chief Complaint: Cellulitis at PEG tube site    HPI Comments: Patient seen for cellulitis at PEG tube site, characterized by induration/erythema/purulence, associated with tenderness to palpation, with no alleviating/exacerbating factors.  He has been on treatment with chemotherapy and XRT for head and neck cancer, but his chemo was held today because of infection at the PEG site.  Per his radiation oncologist with whom I spoke by phone, he had WBC of 18.5.  Patient denies fever.  Per his wife and daughter who were present, he went to ER at [facility] two days ago and was told that the site was OK.  He denies any other complaints currently.    Review of Systems   Constitutional: Negative for chills, fatigue and fever.   Respiratory: Negative for cough, shortness of breath and wheezing.    Cardiovascular: Negative for chest pain and palpitations.   Gastrointestinal: Positive for abdominal pain (at PEG site). Negative for constipation, diarrhea, nausea and vomiting.   Skin: Negative for color change and rash.   All other systems reviewed and are negative.      Objective:       Vitals:    03/01/17 1349   BP: (!) 122/56   Pulse: 76       Physical Exam   Constitutional: He is oriented to person, place, and time. He appears well-developed and well-nourished.   Presents by wheelchair but can get up with assistance   HENT:   Head: Normocephalic and atraumatic.   Mouth/Throat: Oropharynx is clear and moist.   Eyes: Conjunctivae are normal. Pupils are equal, round, and reactive to light. No scleral icterus.   Cardiovascular: Normal rate and regular rhythm.    No murmur heard.  Pulmonary/Chest: Effort normal and breath sounds normal. He has no wheezes.   Abdominal: Soft. Bowel sounds are normal. He exhibits no distension. There is tenderness (PEG site with surrounding erythema, induration, and purulent drainage.  Minor breakdown  secondary to pressure noted.  No obvious evidence of abscess.).   Neurological: He is alert and oriented to person, place, and time.   Vitals reviewed.        Lab Results   Component Value Date    WBC 9.90 02/13/2017    HGB 13.0 (L) 02/13/2017    HCT 39.4 (L) 02/13/2017    MCV 86 02/13/2017     02/13/2017         Chemistry        Component Value Date/Time     04/16/2016 0835    K 4.3 04/16/2016 0835     04/16/2016 0835    CO2 26 04/16/2016 0835    BUN 13 04/16/2016 0835    CREATININE 1.2 12/13/2016 1438    CREATININE 1.1 08/08/2013 1240     04/16/2016 0835        Component Value Date/Time    CALCIUM 9.2 04/16/2016 0835    CALCIUM 9.7 08/08/2013 1240    ALKPHOS 68 04/16/2016 0835    ALKPHOS 64 08/08/2013 1240    AST 17 04/16/2016 0835    AST 17 08/08/2013 1240    ALT 11 04/16/2016 0835    BILITOT 1.2 (H) 04/16/2016 0835          Per radiation oncology, WBC from outside facility was 18.5.    Case discussed with radiation oncology and with surgery.  Concern is that if patient is admitted and there is a significant delay in his XRT, then this could negatively impact his treatment course.  There is also the question of whether to hold or continue his chemotherapy.  Radiation oncology agrees that inpatient admission and IV antibiotics warranted at this time.  I asked if they recommended admission at [facility] instead of [facility] as it relates to ongoing XRT and Dr. Preston states that it does not matter as XRT will be held while inpatient.  He states that ideally, admission will not be longer than a day or two at which time patient can be switched to enteral antibiotics and discharged to resume XRT.  If inpatient stay is longer, then this will complicate his treatment plan.  He will need oncology consultation once admitted with Dr. Bryson to determine when chemotherapy should be resumed.  I discussed all this with the patient and his family and they understand.  I also discussed this with    (hospitalist) who will be accepting the patient for admission and IV antibiotics at [facility].        Assessment:       1. Squamous cell carcinoma of tonsil    2. Status post insertion of percutaneous endoscopic gastrostomy (PEG) tube    3. Cellulitis at gastrostomy tube site        Plan:       1.  Will direct patient to admission/registration at [facility] for admission and IV antibiotics for PEG site cellulitis  2.  See above for discussion  3.  Case discussed with hospitalist, radiation oncologist, and surgery.  4.  Further recommendations to follow after above.

## 2017-03-01 NOTE — H&P
History & Physical    Chief Complaint: Cellulitis around PEG site    History of Present Illness:  Patient is a 86 y.o. male admitted to Hospitalist Service from Dr. Salgado's office with complaint of cellulitis around PEG site . Patient reportedly has past medical history significant for Head and neck cancer s/p Chemo and XRT, hypertension, hyperlipidemia, CAD s/p coronary artery stent placement and hypothyroidism. Per family members, for the past 3 days, skin around PEG site is getting increasingly erythematous with surface purulence. No fever or chills reported. Chemo was held today because of infection at the PEG site. Patient denied chest pain, shortness of breath, nausea, vomiting, headache, vision changes, focal neuro-deficits, cough or fever.    Past Medical History:   Diagnosis Date    Arthritis     Coronary artery disease     Coronary Stent  X1    Depression     Full dentures     Hyperlipidemia     patient is intolerant to statins    Thyroid disease      Past Surgical History:   Procedure Laterality Date    COLON SURGERY      COLONOSCOPY N/A 2/15/2016    Procedure: COLONOSCOPY;  Surgeon: Von Slagado MD;  Location: [facility];  Service: Endoscopy;  Laterality: N/A;    CORONARY STENT PLACEMENT      EYE SURGERY      Bilateral catarac lens    [facility]     [facility]    TONSILLECTOMY       Family History   Problem Relation Age of Onset    No Known Problems Mother     Stroke Father     Diabetes Brother     Stroke Brother 84      of CVA    Cancer Daughter      breast     Cancer Son      testicular    Asthma Grandchild     Cancer Grandchild      testicular with one scrotal removeal    Early death Neg Hx      Social History   Substance Use Topics    Smoking status: Former Smoker     Quit date: 1970    Smokeless tobacco: Never Used    Alcohol use Yes      Comment: rarely      Review of patient's allergies indicates:   Allergen Reactions    Niacin  preparations Hives     PTA Medications   Medication Sig    clopidogrel (PLAVIX) 75 mg tablet Take 75 mg by mouth once daily.    clopidogrel (PLAVIX) 75 mg tablet TAKE ONE TABLET BY MOUTH ONCE DAILY    levothyroxine (SYNTHROID) 50 MCG tablet Take 1 tablet (50 mcg total) by mouth once daily.    losartan (COZAAR) 25 MG tablet Take 1 tablet by mouth once daily.    ondansetron (ZOFRAN-ODT) 8 MG TbDL     pantoprazole (PROTONIX) 40 MG tablet     promethazine (PHENERGAN) 25 MG tablet      Review of Systems:  Constitutional: no fever or chills  Eyes: no visual changes  Ears, nose, mouth, throat, and face: no nasal congestion or sore throat  Respiratory: no cough or shorness of breath  Cardiovascular: no chest pain or palpitations  Gastrointestinal: no nausea or vomiting, no change in bowel habits. + abdominal pain  Genitourinary: no hematuria or dysuria  Integument/breast: see HPI  Hematologic/lymphatic: no easy bruising or lymphadenopathy  Musculoskeletal: no arthralgias or myalgias  Neurological: no seizures or tremors.  Behavioral/Psych: no auditory or visual hallucinations  Endocrine: no heat or cold intolerance     OBJECTIVE:     Vital Signs (Most Recent)     Vitals:    03/01/17 1457   BP: (!) 130/57   Pulse: (!) 116   Resp: 16   Temp: 98.7 °F (37.1 °C)     Physical Exam:  General appearance: well developed, appears stated age, thin, ill-appearing  Head: normocephalic, atraumatic  Eyes:  conjunctivae/corneas clear. PERRL.  Nose: Nares normal. Septum midline.  Throat: lips, mucosa, and tongue normal; teeth and gums normal, no throat erythema.  Neck: supple, symmetrical, trachea midline, no JVD and thyroid not enlarged, symmetric, no tenderness/mass/nodules  Lungs:  clear to auscultation bilaterally and normal respiratory effort  Chest wall: no tenderness  Heart: regular rate and rhythm, S1, S2 normal, no murmur, click, rub or gallop  Abdomen: soft, There is tenderness, PEG site with surrounding erythema,  induration, and purulent drainage. Minor breakdown secondary to pressure noted. No obvious evidence of abscess, non-distented; bowel sounds normal; no masses,  no organomegaly  Extremities: no cyanosis, clubbing or edema.   Pulses: 2+ and symmetric  Skin: Skin color, texture, turgor normal. No rashes or lesions.  Lymph nodes: Cervical, supraclavicular, and axillary nodes normal.  Neurologic: Normal strength and tone. No focal numbness or weakness. CNII-XII intact.      Laboratory:   CBC:   Recent Labs  Lab 03/01/17  1539   WBC 12.80*   RBC 4.59*   HGB 12.8*   HCT 39.6*   PLT 68*   MCV 86   MCH 27.8   MCHC 32.2     CMP:   Recent Labs  Lab 03/01/17  1539      CALCIUM 9.1   ALBUMIN 3.0*   PROT 6.5      K 4.1   CO2 25      BUN 18   CREATININE 0.9   ALKPHOS 61   ALT 11   AST 21   BILITOT 0.9     Coagulation:   Recent Labs  Lab 03/01/17  1539   INR 1.1   APTT 29.9       Recent Labs  Lab 03/01/17  1731   COLORU Yellow   SPECGRAV 1.010   PHUR 6.0   PROTEINUA Negative   NITRITE Negative   LEUKOCYTESUR Negative   UROBILINOGEN Negative       No results found for: HGBA1C  Microbiology Results (last 7 days)     ** No results found for the last 168 hours. **        Diagnostic Results: None    Assessment/Plan:     * Cellulitis  Wound care and apply Bactroban ointment around PEG site.  Continue IV Zosyn and Vancomycin. Vancomycin doing per pharmacy.    Hyperlipidemia  Chronic problem. Will continue chronic medications and monitor for any changes, adjusting as needed.    HTN (hypertension)  Chronic problem. Will continue chronic medications and monitor for any changes, adjusting as needed.    CAD (coronary artery disease), BMS 2003  Patient with known CAD and monitor for S/Sx of angina/ACS. Continue to monitor on telemetry.    GERD (gastroesophageal reflux disease)  Chronic problem. Will continue chronic medications and monitor for any changes, adjusting as needed.    Head and neck cancer  Consult Dr. Bryson  from Oncology who is familiar with the patient.    Discussed with Dr. Naomi sykes multiple family members and answered all the questions.     VTE Risk Mitigation         Ordered     enoxaparin injection 40 mg  Daily     Route:  Subcutaneous        03/01/17 1458     Medium Risk of VTE  Once      03/01/17 1458        Khushboo Gray MD  Department of Hospital Medicine   [facility]

## 2017-03-02 NOTE — PLAN OF CARE
Problem: Nutrition, Enteral (Adult)  Goal: Signs and Symptoms of Listed Potential Problems Will be Absent, Minimized or Managed (Nutrition, Enteral)  Signs and symptoms of listed potential problems will be absent, minimized or managed by discharge/transition of care (reference Nutrition, Enteral (Adult) CPG).  Recommendations  Recommendation/Intervention:   1.) Continue with Isosource 1.5 Bolus feeds 1 carton (250mls) Q4 hrs providing 2250 kcals/day, 102 g protein/day, 264 g CHO/day, and 1146 mls water/day. Check for gastric residuals prior to administering feeds and hold x4 hrs for residuals >250mls. Flush 150 mls Q4 hrs to meet fluid needs or per MD.   2.) Continue with Adult regular/Cardiac diet , (if patient consumes >50% PO of meals , may decrease TF to x5 cartons/day to meet 88% of needs)      Goals: 1.) Patient will meet >80% of EEN 2.) Patient will tolerate feedings   Nutrition Goal Status: 1.) new 2.) new  Communication of SABIHA Recs: (sticky note to MD)

## 2017-03-02 NOTE — PLAN OF CARE
Problem: Patient Care Overview  Goal: Plan of Care Review  Patient remains free from fall or injury.  Bed alarm active and audible.  Patient in room close to nurse's station. Private room/ Family remains at bedside.  Patient can turn and reposition independently and is reminded/encouraged to reposition to prevent skin breakdown.  Peg cleansed and Bactroban ointment applied.   Isosource bolus feeding given as needed after meals.  IV fluids and IV antibiotics given as ordered.  Plan of care reviewed with patient and family.

## 2017-03-02 NOTE — ASSESSMENT & PLAN NOTE
Wound care and apply Bactroban ointment around PEG site.  Continue IV Zosyn and Vancomycin. Vancomycin doing per pharmacy.

## 2017-03-02 NOTE — PROGRESS NOTES
Coverage for Dr. Bryson.  Pt with tonsillar cancer, he is on Rad Rx and weekly Erbitux.  Admitted with infected Peg tube site, on antibiotics, better today.  Abdomen NT  Mucosa and skin at the neck intact.  WBC better at 9,000.  Discussed with Dr. Preston of Rad Rx.  He wants to hold Rad Rx and Erbitux for this week, while the cellulitis is cleared.  We would plan to resume the Rad Rx on Monday, and the Erbitux next week outpt at the [facility] cancer center.

## 2017-03-02 NOTE — PROGRESS NOTES
Progress Note  Hospital Medicine  Patient Name:Brad Luke  MRN:  [id number]  Patient Class: OP- Observation  Admit Date: 3/1/2017  Length of Stay: 0 days  Expected Discharge Date:   Attending Physician: Khushboo Gray MD  Primary Care Provider:  Vikas Carbajal MD    SUBJECTIVE:     Principal Problem: Cellulitis  Initial history of present illness: Patient is a 86 y.o. male admitted to Hospitalist Service from Dr. Salgado's office with complaint of cellulitis around PEG site . Patient reportedly has past medical history significant for Head and neck cancer s/p Chemo and XRT, hypertension, hyperlipidemia, CAD s/p coronary artery stent placement and hypothyroidism. Per family members, for the past 3 days, skin around PEG site is getting increasingly erythematous with surface purulence. No fever or chills reported. Chemo was held today because of infection at the PEG site. Patient denied chest pain, shortness of breath, nausea, vomiting, headache, vision changes, focal neuro-deficits, cough or fever.    PMH/PSH/SH/FH/Meds: reviewed.    Symptoms/Review of Systems: Improving cellulitis around PEG site noted. No shortness of breath, cough, chest pain or headache, fever or abdominal pain.     Diet:  PEG feeding  Activity level: Normal.    Pain:  Patient reports no pain.       OBJECTIVE:   Vital Signs (Most Recent):      Temp: 98.2 °F (36.8 °C) (03/02/17 0500)  Pulse: 76 (03/02/17 0500)  Resp: 18 (03/02/17 0500)  BP: (!) 164/80 (03/02/17 0500)  SpO2: 99 % (03/02/17 0500)       Vital Signs Range (Last 24H):  Temp:  [98.1 °F (36.7 °C)-98.7 °F (37.1 °C)]   Pulse:  []   Resp:  [16-18]   BP: (122-164)/(56-80)   SpO2:  [96 %-99 %]     Weight: 76.2 kg (168 lb)  Body mass index is 26.31 kg/(m^2).    Intake/Output Summary (Last 24 hours) at 03/02/17 1036  Last data filed at 03/01/17 2200   Gross per 24 hour   Intake             1515 ml   Output                0 ml   Net             1515 ml     Physical Examination:  General  appearance: well developed, appears stated age, thin, ill-appearing  Head: normocephalic, atraumatic  Eyes: conjunctivae/corneas clear. PERRL.  Nose: Nares normal. Septum midline.  Throat: lips, mucosa, and tongue normal; teeth and gums normal, no throat erythema.  Neck: supple, symmetrical, trachea midline, no JVD and thyroid not enlarged, symmetric, no tenderness/mass/nodules  Lungs: clear to auscultation bilaterally and normal respiratory effort  Chest wall: no tenderness  Heart: regular rate and rhythm, S1, S2 normal, no murmur, click, rub or gallop  Abdomen: soft, There is tenderness, PEG site with improving surrounding erythema, induration, and purulent drainage. Minor breakdown secondary to pressure noted. No obvious evidence of abscess, non-distented; bowel sounds normal; no masses, no organomegaly  Extremities: no cyanosis, clubbing or edema.   Pulses: 2+ and symmetric  Skin: Skin color, texture, turgor normal. No rashes or lesions.  Lymph nodes: Cervical, supraclavicular, and axillary nodes normal.  Neurologic: Normal strength and tone. No focal numbness or weakness. CNII-XII intact.     CBC:    Recent Labs  Lab 03/01/17  1539 03/02/17  0507   WBC 12.80* 9.70   RBC 4.59* 4.04*   HGB 12.8* 11.4*   HCT 39.6* 34.6*   PLT 68* 75*   MCV 86 86   MCH 27.8 28.3   MCHC 32.2 33.1   BMP    Recent Labs  Lab 03/01/17  1539 03/02/17  0507    115*    138   K 4.1 4.1    104   CO2 25 26   BUN 18 16   CREATININE 0.9 1.0   CALCIUM 9.1 8.6*      Diagnostic Results:  Microbiology Results (last 7 days)     ** No results found for the last 168 hours. **         Assessment/Plan:      * Cellulitis  Wound care and apply Bactroban ointment around PEG site.  Continue IV Zosyn and Vancomycin. Vancomycin doing per pharmacy.     Hyperlipidemia  Chronic problem. Will continue chronic medications and monitor for any changes, adjusting as needed.     HTN (hypertension)  Chronic problem. Will continue chronic medications  and monitor for any changes, adjusting as needed.     CAD (coronary artery disease), BMS 2003  Patient with known CAD and monitor for S/Sx of angina/ACS. Continue to monitor on telemetry.     GERD (gastroesophageal reflux disease)  Chronic problem. Will continue chronic medications and monitor for any changes, adjusting as needed.     Head and neck cancer  Appreciate Dr. MCKINLEY Santos's assistance. XRT is planned for Monday and Chemo next week..    Discussed with multiple family members.   VTE Risk Mitigation         Ordered     enoxaparin injection 40 mg  Daily     Route:  Subcutaneous        03/01/17 1458     Medium Risk of VTE  Once      03/01/17 1458        Khushboo Gray MD  Department of Hospital Medicine   [facility]

## 2017-03-02 NOTE — PLAN OF CARE
Problem: Patient Care Overview  Goal: Plan of Care Review  Outcome: Revised  Pt alert to self only. Pt able to verbalize needs/pain/wants. Pt poc reviewed with pt and pt wife and  pt wife stated understanding. Pt denies pain/sob at this time. Pt has ivf and iv abt running as ordered. Pt safety maintained thus far this shift. Pt has bed lowered, call light within reach,bed alarm set,  and will continue to monitor during my shift.

## 2017-03-02 NOTE — PLAN OF CARE
The pt was awake and alert and able to verify all info on the face sheet as correct. He was visiting with his adult son, Johnnie Luke Jr and he assisted with answering some of the questions. The pt and his wife recently moved in with his adult daughter and son in law. His daughter and son assist in getting him to medical appts. The pt arrived from [facility]. He does not use any DME or HH. He has no questions or concerns at this time. I wrote my name and phone number on the pts white board. [facility] LENORE     03/02/17 1042   Discharge Assessment   Assessment Type Discharge Planning Assessment   Confirmed/corrected address and phone number on facesheet? Yes   Assessment information obtained from? Patient;Caregiver   Communicated expected length of stay with patient/caregiver no   Type of Healthcare Directive Received (Pts spouse Kami Luke [phone])   If Healthcare Directive is received, is it scanned into Epic? no (comment)   Prior to hospitilization cognitive status: Alert/Oriented   Prior to hospitalization functional status: Independent   Current cognitive status: Alert/Oriented   Current Functional Status: Independent   Arrived From ([facility] )   Lives With other (see comments);child(chris), adult   Able to Return to Prior Arrangements yes   Is patient able to care for self after discharge? Yes   How many people do you have in your home that can help with your care after discharge? 3   Who are your caregiver(s) and their phone number(s)? spouse, daughter and son    Readmission Within The Last 30 Days no previous admission in last 30 days   Patient currently being followed by outpatient case management? No   Patient currently receives home health services? No   Does the patient currently use HME? No   Patient currently receives private duty nursing? No   Patient currently receives any other outside agency services? No   Equipment Currently Used at Home none   Do you have any problems  affording any of your prescribed medications? No  ([facility] [place], [place] )   Is the patient taking medications as prescribed? yes   Do you have any financial concerns preventing you from receiving the healthcare you need? No   Does the patient have transportation to healthcare appointments? Yes   Transportation Available family or friend will provide   On Dialysis? No   Does the patient receive services at the Coumadin Clinic? No   Are there any open cases? No   Discharge Plan A Home   Discharge Plan B Home with family   Patient/Family In Agreement With Plan yes

## 2017-03-02 NOTE — CONSULTS
[facility]  Adult Nutrition  Consult Note    SUMMARY     Recommendations  Recommendation/Intervention:   1.) Continue with Isosource 1.5 Bolus feeds 1 carton (250mls) Q4 hrs providing 2250 kcals/day, 102 g protein/day, 264 g CHO/day, and 1146 mls water/day. Check for gastric residuals prior to administering feeds and hold x4 hrs for residuals >250mls. Flush 150 mls Q4 hrs to meet fluid needs or per MD.   2.) Continue with Adult regular/Cardiac diet , (if patient consumes >50% PO of meals , may decrease TF to x5 cartons/day to meet 88% of needs)     Goals: 1.) Patient will meet >80% of EEN 2.) Patient will tolerate feedings   Nutrition Goal Status: 1.) new 2.) new  Communication of RD Recs:  (sticky note to MD)    1. Cellulitis      Past Medical History:   Diagnosis Date    Arthritis     Cancer     Coronary artery disease 1998    Coronary Stent  X1    Depression     Full dentures     Hyperlipidemia     patient is intolerant to statins    Thyroid disease        Reason for Assessment  Reason for Assessment: nurse/nurse practitioner consult  Interdisciplinary Rounds: attended  General Information Comments: Admits with cellulitis around peg site. Patient with PMH of head and neck cancer. Undergoing chemo/radiation. Family in room, reports patient has altered taste due to radiation. Patient also eats PO, and consumed scrambled eggs this am (25% of tray). Does Bolus feeds at home, though family is unsure of formula.     Nutrition Prescription Ordered  Current Diet Order: Cardiac diet  Current Nutrition Support Formula Ordered: Isosource 1.5  Current Nutrition Support Rate Ordered: 250 (ml)  Current Nutrition Support Frequency Ordered: Bolus, x6 cans/day        Evaluation of Received Nutrients/Fluid Intake  Enteral Calories (kcal): 2250  Enteral Protein (gm): 102  Enteral (Free Water) Fluid (mL): 1146  IV Fluid (mL): 1800  Tolerance: tolerating     Nutrition Risk Screen  Nutrition Risk Screen:  unintentional loss of 10 lbs or more in the past 2 mos, dysphagia or difficulty swallowing    Nutrition/Diet History  Patient Reported Diet/Restrictions/Preferences: general    Labs/Tests/Procedures/Meds  Diagnostic Test/Procedure Review: reviewed, pertinent  Pertinent Labs Reviewed: reviewed, pertinent  BMP  Lab Results   Component Value Date     2017    K 4.1 2017     2017    CO2 26 2017    BUN 16 2017    CREATININE 1.0 2017    CALCIUM 8.6 (L) 2017    ANIONGAP 8 2017    ESTGFRAFRICA >60 2017    EGFRNONAA >60 2017     Lab Results   Component Value Date    ALBUMIN 2.6 (L) 2017     Lab Results   Component Value Date    CALCIUM 8.6 (L) 2017     No results for input(s): POCTGLUCOSE in the last 24 hours.    Pertinent Medications Reviewed: reviewed  Scheduled Meds:   clopidogrel  75 mg Oral Daily    enoxaparin  40 mg Subcutaneous Daily    levothyroxine  50 mcg Oral Daily    losartan  25 mg Oral Daily    mupirocin   Topical (Top) TID    pantoprazole  40 mg Intravenous Daily    piperacillin-tazobactam 4.5 g in dextrose 5 % 100 mL IVPB (ready to mix system)  4.5 g Intravenous Q8H    vancomycin (VANCOCIN) IVPB  15 mg/kg Intravenous Q24H     Continuous Infusions:   sodium chloride 0.45% 75 mL/hr at 17 1727         Physical Findings  Oral/Mouth Cavity: no dental appliances present  Skin:  (juliocesar score 18)    Anthropometrics  Height (inches): 67.01 in  Weight Method: Stated  Weight (kg): 76.2 kg  Ideal Body Weight (IBW), Male: 148.06 lb  % Ideal Body Weight, Male (lb): 113.46 lb  BMI (kg/m2): 26.3  BMI Grade: 25 - 29.9 - overweight  Usual Body Weight (UBW), k.6 kg (x3 weeks ago )  % Usual Body Weight: 96.95  % Weight Change:  (2.8% in 3 weeks)  Weight Loss: unintentional    Estimated/Assessed Needs  Weight Used For Calorie Calculations: 76.2 kg (167 lb 15.9 oz)   Height (cm): 170.2 cm  Energy Need Method: [facility]  (x1.5= 2109 kcals/day (oncology))  RMR (Taliaferro-St. Jeor Equation): 1406.87  Weight Used For Protein Calculations: 76.2 kg (167 lb 15.9 oz)  1.2 gm Protein (gm): 91.63 and 1.5 gm Protein (gm): 114.54  Fluid Need Method: RDA Method (or per MD)   Grams of CHO per day: not required    Monitor and Evaluation  Food and Nutrient Intake: energy intake, enteral nutrition intake, food and beverage intake  Food and Nutrient Adminstration: diet order, enteral and parenteral nutrition administration  Anthropometric Measurements: weight, weight change, body mass index  Biochemical Data, Medical Tests and Procedures: electrolyte and renal panel, glucose/endocrine profile, lipid profile  Nutrition-Focused Physical Findings: overall appearance    Nutrition Risk  Level of Risk: high (x2 weekly)    Nutrition Follow-Up  RD Follow-up?: Yes    Assessment and Plan  No new Assessment & Plan notes have been filed under this hospital service since the last note was generated.  Service: Nutrition    Nutrition Diagnosis: inadequate energy intake  Etiology: increased nutrient needs  Signs/Symptoms: 1.) chemo/radiation therapy 2.) 2.8% weight loss in 3 weeks reports  Status; new    %EEN: 100% via TF   % meals intake: 25%    Discharge Planning: discharge on peg feeds above. If patient consumed >50% at meals, may decrease to x5 cans/day.

## 2017-03-03 NOTE — PROGRESS NOTES
Peg site dressing removed, cleansed with saline, dried and ointment applied with a gauze dressing and taped to abdomen, pts wife and daughter was in the room and educated during the whole process, both verbalized an understanding.

## 2017-03-03 NOTE — PROGRESS NOTES
Less than 25% of lunch eaten.  10 mL residual at this time.  2 cans isosource given.  Flushed with 60mL prior to and 60 mL after tube feeding.  Patient tolerated well.  Family at bedside educated on tube feedings.

## 2017-03-03 NOTE — PLAN OF CARE
Faxed home health orders to [facility] via [facility]. Awaiting acceptance. MESHA Burrows     1434- Called [facility], per intake they do not service [facility].    1435-Faxed to [facility] health via [facility], awaiting acceptance. MESHA Burrows     The referral for the patient in [facility], room 323, bed 323 A admitted at 3/1/2017 2:30 PM has been updated by [email or web address] ([email or web address]).  Update: Accepted by [facility] . MESHA Burrows

## 2017-03-03 NOTE — PLAN OF CARE
Problem: Patient Care Overview  Goal: Plan of Care Review  Outcome: Ongoing (interventions implemented as appropriate)  Pt with ongoing care with peg tube site care. Dressing care to peg site done as ordered. Pt refused full tube feeding only accepting 1 can. Pt tolerated peg tube flushes with water well. Pt remains afebrile thus far this shift. Pt with incontinence episodes of urine during sleep cycles. Spouse at bedside. IVF and IV antibiotics continue as ordered. Pt denies pain at this time. Call light in easy reach.

## 2017-03-03 NOTE — CONSULTS
Brad Luke [id number] is a 86 y.o. male who has been consulted for vancomycin dosing.    Pharmacy consult for vancomycin dosing in no longer required.  Vancomycin was discontinued.    Thank you for allowing us to participate in this patient's care.     -Mary Quan, PharmD

## 2017-03-03 NOTE — H&P
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  Found to have cellulitis at peg tube site, on antibiotics.  Coverage for Dr. Bryson.  Consultation.    Mucosa intact, skin intact  Lung clear  rr with m  Abdomen nt, peg tube in place  Calf nt l & r, no edema    WBC 13,000    ROS fatigue, mild alzheimers    Tonsillar cancer.  Discussed with Dr. Preston of Rad Rx.  Plan to resume radiation and Erbitux 3/6 for treatment of tonsillar cancer, after cellulitis has been cleared.  Coverage for Dr Bryson

## 2017-03-03 NOTE — PROGRESS NOTES
Spoke with the pt and his wife at the bedside along with Dr Gray; the pt thinks he is much better and ready to be discharged home. His wife is requesting home health for additional PEG tube teaching, she states that she has never dealt with one before and thinks a few visits from a home health nurse would help with her education.   I also asked the pt's nurse, Fausto to do teaching with the pt and his wife before discharge this afternoon.....WENDI Hilliard CM

## 2017-03-03 NOTE — SUBJECTIVE & OBJECTIVE
Interval History:  This 87 y/o male has tonsillar Ca, undergoing Radiation therapy and weekly Erbitux.  Yesterday, found to have infected PEG tube.  Currently on antibiotics.  Coverage for Dr. Bryson.    Oncology Treatment Plan:  Discussed with Dr. Preston of Radiation Therapy.  Resume radiation and weekly Erbitux 3/6/17, for treatment of tonsillar cancer, after peg tube cellulitis has been cleared.   [No treatment plan]    Medications:  Continuous Infusions:   sodium chloride 0.45% 75 mL/hr at 03/01/17 1727     Scheduled Meds:   clopidogrel  75 mg Oral Daily    enoxaparin  40 mg Subcutaneous Daily    levothyroxine  50 mcg Oral Daily    losartan  25 mg Oral Daily    mupirocin   Topical (Top) TID    pantoprazole  40 mg Intravenous Daily    piperacillin-tazobactam 4.5 g in dextrose 5 % 100 mL IVPB (ready to mix system)  4.5 g Intravenous Q8H    vancomycin (VANCOCIN) IVPB  15 mg/kg Intravenous Q24H     PRN Meds:acetaminophen, dextrose 50%, dextrose 50%, glucagon (human recombinant), glucose, glucose, hydrocodone-acetaminophen 5-325mg, ondansetron, promethazine     Review of Systems   Constitutional: Positive for fatigue.   Gastrointestinal: Positive for abdominal pain.   Skin: Positive for wound.      See H/P  Objective:as below     Vital Signs (Most Recent):  Temp: 98.2 °F (36.8 °C) (03/02/17 1700)  Pulse: 71 (03/02/17 1700)  Resp: 20 (03/02/17 1700)  BP: (!) 130/58 (03/02/17 1700)  SpO2: 98 % (03/02/17 1700) Vital Signs (24h Range):  Temp:  [97.7 °F (36.5 °C)-98.2 °F (36.8 °C)] 98.2 °F (36.8 °C)  Pulse:  [68-76] 71  Resp:  [18-20] 20  SpO2:  [97 %-99 %] 98 %  BP: (130-164)/(58-80) 130/58     Weight: 76.2 kg (168 lb)  Body mass index is 26.31 kg/(m^2).  Body surface area is 1.9 meters squared.      Intake/Output Summary (Last 24 hours) at 03/02/17 2133  Last data filed at 03/02/17 1900   Gross per 24 hour   Intake             2155 ml   Output              850 ml   Net             1305 ml       Physical  Exam   Eyes: No scleral icterus.   Neck: Normal range of motion. Neck supple.   Cardiovascular: Normal rate and regular rhythm.    Murmur heard.  Pulmonary/Chest: Effort normal and breath sounds normal.   Abdominal: Soft. He exhibits no distension. There is no tenderness. There is no guarding.   Musculoskeletal: Normal range of motion. He exhibits no edema or tenderness.   Neurological: He is alert.   Skin: There is pallor.   Psychiatric: He has a normal mood and affect.      Mucosa intact, skin in rad rx field intact  Lung clear l & r  rr with m  Nt, without distention, peg tube in place  Calf nt l & r leg, no edema    Significant Labs:   WBC 13,000  CBC:   Recent Labs  Lab 03/01/17  1539 03/02/17  0507   WBC 12.80* 9.70   HGB 12.8* 11.4*   HCT 39.6* 34.6*   PLT 68* 75*       Diagnostic Results:  I have reviewed all pertinent imaging results/findings within the past 24 hours.

## 2017-03-03 NOTE — NURSING
Pt discharged home with his wife and daughter, discharge instructions discussed thoroughly with both of them, both verbalized an understanding, new prescriptions given to pt and discussed how to take them, they are aware of all follow up appointments and how to do wound care to the peg site, tele removed, no further questions or concerns, escorted off the unit via wheelchair.

## 2017-03-03 NOTE — PROGRESS NOTES
Patient ate 50% of dinner tray.  240mL can of isosource given.  Flushed with 120mL water.  Patient tolerated well.

## 2017-03-03 NOTE — DISCHARGE SUMMARY
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     Discharged Condition: Good    Hospital Course:   Patient is a 86 y.o. male admitted to Hospitalist Service from Dr. Salgado's office with complaint of cellulitis around PEG site . Patient reportedly has past medical history significant for Head and neck cancer s/p Chemo and XRT, hypertension, hyperlipidemia, CAD s/p coronary artery stent placement and hypothyroidism. Per family members, for the past 3 days, skin around PEG site was getting increasingly erythematous with surface purulence. No fever or chills reported. Chemo was held today because of infection at the PEG site. Patient denied chest pain, shortness of breath, nausea, vomiting, headache, vision changes, focal neuro-deficits, cough or fever. Patient was admitted to Hospitalist medicine service. Patient was evaluated by Dr. MCKINLEY Santos on call for Dr. Bryson. Patient was started on IV antibiotics. Routine wound care continued. Patient's symptoms improved and patient transitioned to PO antibiotics. Patient to resume XRT on 03-06-17 and chemo next week. Home health is arranged for PEG site care and cellulitis management. Patient was discharged home in stable condition with following discharge plan of care.     Consults:      Significant Diagnostic Studies:     Microbiology  Results (last 7 days)     ** No results found for the last 168 hours. **        Special Treatments/Procedures: None  Disposition: Home or Self Care    Medications:  Reconciled Home Medications: Current Discharge Medication List      START taking these medications    Details   cephALEXin (KEFLEX) 125 mg/5 mL SusR Take 20 mLs (500 mg total) by mouth every 8 (eight) hours.  Qty: 600 mL, Refills: 0      mupirocin (BACTROBAN) 2 % ointment Apply topically 2 (two) times daily. Apply to PEG site rash  Qty: 30 g, Refills: 0         CONTINUE these medications which have CHANGED    Details   clopidogrel (PLAVIX) 75 mg tablet Take 1 tablet (75 mg total) by mouth once daily.      levothyroxine (SYNTHROID) 50 MCG tablet Take 1 tablet (50 mcg total) by mouth once daily.  Qty: 30 tablet, Refills: 0    Comments: carlton  Associated Diagnoses: Hypothyroidism due to acquired atrophy of thyroid         CONTINUE these medications which have NOT CHANGED    Details   losartan (COZAAR) 25 MG tablet Take 1 tablet by mouth nightly as needed.       ondansetron (ZOFRAN-ODT) 8 MG TbDL       pantoprazole (PROTONIX) 40 MG tablet       promethazine (PHENERGAN) 25 MG tablet              Discharge Procedure Orders  Ambulatory referral to Home Health   Referral Priority: Routine Referral Type: Home Health   Referral Reason: Specialty Services Required    Requested Specialty: Home Health Services    Number of Visits Requested: 1      Diet general   Order Comments: PEG feeding: Isosource 1.5 can 5 times daily as before.     Other restrictions (specify):   Order Comments: Fall precautions     Call MD for:   Order Comments: For worsening symptoms, chest pain, shortness of breath, increased abdominal pain, high grade fever, stroke or stroke like symptoms, immediately go to the nearest Emergency Room or call 911 as soon as possible.       Follow-up Information     Follow up with [facility].    Specialties:  Home Health Services, Physical  Therapy, Occupational Therapy    Why:  Home Health    Contact information:    [address] [address]  [phone]          Follow up with Vikas Carbajal MD In 1 week.    Specialty:  Family Medicine    Contact information:    [address]  [facility] [id number] [phone]          Please follow up.    Contact information:    Follow up on Monday 03-06-17 for radiation treatment. Your chemotherapy is scheduled for next week.

## 2017-03-04 NOTE — TELEPHONE ENCOUNTER
Spoke with daughter and she stated that the dad has starting itching a little bit with the K-flex.  Home Health nurse instructed them to take a Benadryl with the medication.  Daughter stated that he is taking it for the  infection around gtube.  Needs to refill and wanted to know if she sould refill it or do you want to call something new in for him.

## 2017-03-04 NOTE — TELEPHONE ENCOUNTER
----- Message from Emmie Rodriguez sent at 3/4/2017  9:40 AM CST -----  Contact: daughter Linda   Patient's daughter Linda called stating the Dr. Carbajal gave him of Cephalexin 20 mg  It is causing him to itch    Please call  [phone] to advise     Thanks

## 2017-03-05 NOTE — PLAN OF CARE
03/05/17 0929   Final Note   Assessment Type Discharge Planning Assessment   Discharge Disposition Home-Health   Discharge planning education complete? Yes

## 2017-03-07 NOTE — TELEPHONE ENCOUNTER
Call placed to patient, spoke with patient's wife and daughter. Notification of orders to D/C Keflex/start Bactrim given. Patient's wife and daughter both verbalized understanding.

## 2017-03-10 NOTE — TELEPHONE ENCOUNTER
----- Message from Shay Forbes sent at 3/10/2017 11:10 AM CST -----  Contact: pt's wife Kami Krishnan is requesting a refill on his Plavix  Call Back#[phone]  Thanks    [address] CARLO [place] - [address] [facility] [facility]  [address] [facility] [place] [address]  Phone: [phone] Fax: [phone]

## 2017-03-10 NOTE — TELEPHONE ENCOUNTER
Peg tube site is better. No antibiotics. Spoken with wife. Stop oral antibiotics.This has been fully explained to the patient, who indicates understanding.

## 2017-03-10 NOTE — TELEPHONE ENCOUNTER
----- Message from Madhavi Blount sent at 3/10/2017 12:53 PM CST -----  New antibiotic is causing him to itch , asking to discuss changing ... Call  Kami Luke [phone]

## 2017-03-10 NOTE — TELEPHONE ENCOUNTER
Patient was taking Keflex for infection but was causing patient to itch all over. Med was changed to Bactrim.   Took last dose of Keflex yesterday AM and 1st dose of Bactrim last night. Patient is still itching all over and does not know if it is from the Keflex or the Bactrim. Wife gave him Benadryl which is controlling the itching.  Please advise.

## 2017-03-13 NOTE — TELEPHONE ENCOUNTER
Spoke with patient wife to schedule 2 week follow up. She states its hard with radiation treatment and chemotherapy and she will call back to schedule.

## 2017-03-15 NOTE — TELEPHONE ENCOUNTER
Phoned patient spoke with wife she states patient cannot come in for follow up appointment suffering with chemo and throat cancer.

## 2017-03-15 NOTE — TELEPHONE ENCOUNTER
----- Message from Agustín Mansfield sent at 3/15/2017  3:04 PM CDT -----  Contact: Patient's wife Kami  Patient's wife Kami called requesting a sooner appointment. Due to patient legs go out patient experiencing un sudden falling. Please call back at [phone] to confirm appointment. I tried to set appointment. Thanks,

## 2017-03-16 NOTE — TELEPHONE ENCOUNTER
Call placed to patient regarding order for wheelchair. Patient's wife states patient was not home, but did indicate patient received order for wheelchair from another doctor. States this order is no longer needed.

## 2017-03-17 NOTE — PROGRESS NOTES
Pre-Visit Chart Review  For Appointment Scheduled on 3/20/17    Health Maintenance Due   Topic Date Due    Lipid Panel  05/08/2016    Pneumococcal (65+) (2 of 2 - PCV13) 01/21/2017

## 2017-03-26 NOTE — ED PROVIDER NOTES
Encounter Date: 3/26/2017       History     Chief Complaint   Patient presents with    Blood in tube     blood in feeding tube; never seen before     Review of patient's allergies indicates:   Allergen Reactions    Niacin preparations Hives     HPI Comments: 86-year-old male with a past medical history of throat cancer presents with chief complaint of blood noted in his PEG tube.  The patient's family members noted acutely today.  The patient's wife  reports that he accidentally pulled on the tube earlier, and she pushed it back in, without difficulty.  The patient has been having his normal feeds 3 times a day through the tube without any difficulty.  The patient denies any associated pain, vomiting, diarrhea, blood in his stool, or black stools.  The patient does have some chronic oral bleeding from his throat cancer.     The history is provided by a relative.     Past Medical History:   Diagnosis Date    Arthritis     Cancer     Coronary artery disease     Coronary Stent  X1    Depression     Full dentures     Hyperlipidemia     patient is intolerant to statins    Thyroid disease      Past Surgical History:   Procedure Laterality Date    COLON SURGERY      COLONOSCOPY N/A 2/15/2016    Procedure: COLONOSCOPY;  Surgeon: Von Salgado MD;  Location: [facility];  Service: Endoscopy;  Laterality: N/A;    CORONARY STENT PLACEMENT      EYE SURGERY      Bilateral catarac lens    [facility]     [facility]    TONSILLECTOMY       Family History   Problem Relation Age of Onset    No Known Problems Mother     Stroke Father     Diabetes Brother     Stroke Brother 84      of CVA    Cancer Daughter      breast     Cancer Son      testicular    Asthma Grandchild     Cancer Grandchild      testicular with one scrotal removeal    Early death Neg Hx      Social History   Substance Use Topics    Smoking status: Former Smoker     Quit date: 1970    Smokeless tobacco: Never Used     Alcohol use No      Comment: rarely     Review of Systems   Constitutional: Negative for chills, diaphoresis, fatigue and fever.   HENT: Negative for congestion and rhinorrhea.    Respiratory: Negative for cough and shortness of breath.    Cardiovascular: Negative for chest pain.   Gastrointestinal: Negative for abdominal pain, diarrhea, nausea and vomiting.        Bleeding from PEG tube.   Genitourinary: Negative for dysuria, frequency and testicular pain.   Musculoskeletal: Negative for gait problem.   Skin: Negative for color change.   Neurological: Negative for dizziness and numbness.   Psychiatric/Behavioral: Negative for agitation and confusion.       Physical Exam   Initial Vitals   BP Pulse Resp Temp SpO2   03/26/17 0441 03/26/17 0441 03/26/17 0441 03/26/17 0441 03/26/17 0441   140/66 88 18 98.6 °F (37 °C) 97 %     Physical Exam    Nursing note and vitals reviewed.  Constitutional: He appears well-developed and well-nourished.   HENT:   Head: Normocephalic and atraumatic.   Small amount of bleeding noted in oralpharyngeal region.   Eyes: EOM are normal. Pupils are equal, round, and reactive to light.   Neck: Neck supple.   Cardiovascular: Normal rate and regular rhythm.   Pulmonary/Chest: Breath sounds normal.   Abdominal: Soft. Bowel sounds are normal.   PEG tube intact to the anterior abdominal wall with a small amount of old blood noted in the tube.  PEG tube site clear without drainage, bleeding, or redness.   Musculoskeletal: Normal range of motion.   Neurological: He is alert and oriented to person, place, and time.   Skin: Skin is warm and dry.   Psychiatric: He has a normal mood and affect.         ED Course   Procedures  Labs Reviewed - No data to display          Medical Decision Making:   Initial Assessment:   86-year-old male presented with a chief complaint of bleeding in his PEG tube.  Differential Diagnosis:   Initial differential diagnosis included but not limited to abdominal injury, GI  bleed, and residual bleeding from his throat cancer.  ED Management:  The patient was urgently evaluated in the ED, his evaluation was significant for an elderly male with a benign abdominal exam.  The patient does have some old blood noted in his PEG tube, however his tube flushes easily with small amounts of old blood noted in the gastric fluid.  I believe the etiology the patient's bleeding in his PEG tube is likely secondary to him swallowing the blood in the back of his throat, secondary to throat cancer.  It is not a large amount of bleeding and I do not believe that is life-threatening at this time.  The patient's airway is not compromised as well.  The patient's PEG tube was flushed and functions normally.  He is stable for discharge to home.  He is to follow-up with his PCP for further care.  They've been instructed to return to the ED for any episodes of bloody stool, black stools, or large amounts of bleeding noted in his PEG tube.                     ED Course     Clinical Impression:   The encounter diagnosis was Bleeding in mouth.          Paco Caballero MD  03/26/17 0568

## 2017-03-26 NOTE — ED NOTES
Pt presents with concerns from seeing blood in his PEG tube yesterday. Dark colored drainage in tube at this time. No noted bleeding or drainage from insertion site. Family says tube seems looser than usual. Pt also complains of spitting up blood but does that anyway due to having throat cancer. Pt says has been more than usual here lately though. .

## 2017-03-26 NOTE — ED AVS SNAPSHOT
[facility] [address]  [place] [address]               Brad Luke   3/26/2017  4:46 AM   ED    Description:  Male : 1930   Department:  [facility]           Your Care was Coordinated By:     Provider Role From To    Paco Caballero MD Attending Provider 17 0447 --      Reason for Visit     Blood in tube           Diagnoses this Visit        Comments    Bleeding in mouth    -  Primary       ED Disposition     ED Disposition Condition Comment    Discharge             To Do List           Follow-up Information     Follow up with Vikas Carbajal MD. Schedule an appointment as soon as possible for a visit in 1 week.    Specialty:  Family Medicine    Why:  return to the ED, As needed, If symptoms worsen    Contact information:    [address] [facility] [address]  [phone]        [facility] On Call     [facility] -  Assistance  Registered nurses in the [facility] provide clinical advisement, health education, appointment booking, and other advisory services.  Call for this free service at [phone].             Medications           Message regarding Medications     Verify the changes and/or additions to your medication regime listed below are the same as discussed with your clinician today.  If any of these changes or additions are incorrect, please notify your healthcare provider.             Verify that the below list of medications is an accurate representation of the medications you are currently taking.  If none reported, the list may be blank. If incorrect, please contact your healthcare provider. Carry this list with you in case of emergency.           Current Medications     hydrocodone-acetaminophen (HYCET) solution 7.5-325 mg/15mL     levothyroxine (SYNTHROID) 50 MCG tablet Take 1 tablet (50 mcg total) by mouth once daily.    clopidogrel (PLAVIX) 75 mg tablet Take 1 tablet (75 mg  "total) by mouth once daily.    losartan (COZAAR) 25 MG tablet Take 1 tablet by mouth nightly as needed.     ondansetron (ZOFRAN-ODT) 8 MG TbDL     pantoprazole (PROTONIX) 40 MG tablet     promethazine (PHENERGAN) 25 MG tablet            Clinical Reference Information           Your Vitals Were     BP Pulse Temp Resp Height Weight    140/66 (BP Location: Right arm, Patient Position: Sitting) 88 98.6 °F (37 °C) (Oral) 18 5' 8" (1.727 m) 77.6 kg (171 lb)    SpO2 BMI             97% 26 kg/m2         Allergies as of 3/26/2017        Reactions    Niacin Preparations Hives      Immunizations Administered on Date of Encounter - 3/26/2017     None      ED Micro, Lab, POCT     None      ED Imaging Orders     None      Smoking Cessation     If you would like to quit smoking:   You may be eligible for free services if you are a [place] resident and started smoking cigarettes before September 1, 1988.  Call the Smoking Cessation Trust ([facility]) toll free at (464) 176-0949 or (602) 727-2226.   Call 1-800-QUIT-NOW if you do not meet the above criteria.             [facility] complies with applicable Federal civil rights laws and does not discriminate on the basis of race, color, national origin, age, disability, or sex.        Language Assistance Services     ATTENTION: Language assistance services are available, free of charge. Please call [phone].      ATENCIÓN: Si habla español, tiene a brock disposición servicios gratuitos de asistencia lingüística. Llame al [phone].     [facility] Ý: N?u b?n nói Ti?ng Vi?t, có các d?ch v? h? tr? ngôn ng? mi?n phí dành cho b?n. G?i s? [phone].        "

## 2017-03-26 NOTE — ED NOTES
PEG tube flushed with 90 cc's sterile water. Returned yellowish-pinkish colored drainage with little small clots of dark matter which could be clots. No noted active bright red blood returned. Pt tolerated well.

## 2017-04-04 NOTE — TELEPHONE ENCOUNTER
Call placed to patient's wife (Kami) regarding recommendations per Dr. Carbajal related to BP medication and for notification of orders for Home Health. No answer received. Left message to call office.

## 2017-04-04 NOTE — TELEPHONE ENCOUNTER
Received message from Krystal with [facility] requesting orders for Home Health, Physical Therapy, and Occupational Therapy. Orders placed by Dr. Carbajal at this time. Call placed to Ms. Rice for notification of orders. MsTobi Rice requested orders be faxed to [phone]. Orders faxed as requested. Call placed to patient's wife  at [phone] for notification of recommendations per Dr. Carbajal related to BP medication. No answer received. Left message to call office regarding. Will follow-up with telephone call to wife later on this date.

## 2017-04-04 NOTE — TELEPHONE ENCOUNTER
Patient's wife (Kami) notified of orders related to BP. Verbalized understanding. Mrs. Mcclure requesting patient see a Neurologist. Please advise.

## 2017-04-04 NOTE — TELEPHONE ENCOUNTER
----- Message from Michelle Islas sent at 4/4/2017 11:27 AM CDT -----  Contact: Kami  Patient's wife is calling as concerned at patient is sleeping a lot. States called chemo doctor and was told to take to ER; when take to ER blood pressure is checked and send home. Also patient has fallen several times; when gets up to urinate right leg shakes and then whole body shakes and then falls. At this point when walks he falls due to shaking; also shakes in bed at night while sleeping. Asking for Dr Carbajal's advice. Please call [phone]. Thanks!

## 2017-04-04 NOTE — TELEPHONE ENCOUNTER
----- Message from Michelle Islas sent at 4/4/2017 11:27 AM CDT -----  Contact: Kami  Patient's wife is calling as concerned at patient is sleeping a lot. States called chemo doctor and was told to take to ER; when take to ER blood pressure is checked and send home. Also patient has fallen several times; when gets up to urinate right leg shakes and then whole body shakes and then falls. At this point when walks he falls due to shaking; also shakes in bed at night while sleeping. Asking for Dr Carbajal's advice. Please call [phone]. Thanks!

## 2017-04-04 NOTE — TELEPHONE ENCOUNTER
Metabolic encephalopathy ( mental weakness, and poor alertness)maybe related to poor blood flow or low blood pressure or electrolytes imbalance. The term encephalopathy means that the brain activity is impaired , therefore works slow , or some high brain functions are not able to be done due to lack of proper PH, or flow, or neurotoxins not able to be metabolized.There was a MRI of brain ordered last year.

## 2017-04-10 NOTE — TELEPHONE ENCOUNTER
Received message from patient's wife stating patient has a MRI scheduled for 4-15-17. Patient lives in [place], requesting order be faxed to [facility] at [phone]. Orders faxed at this time.

## 2017-04-10 NOTE — TELEPHONE ENCOUNTER
----- Message from Michelle Islas sent at 4/10/2017  4:27 PM CDT -----  Contact: Kami  Patient's wife is asking if can send orders for testing to [facility]. Fax number [phone]. Please call to let know if able to send [phone]. Thanks!

## 2017-04-10 NOTE — TELEPHONE ENCOUNTER
Per Laura Townsend MA patient's wife stated she was going to get neurologist in [place] and would call office back for an external referral.

## 2017-04-12 NOTE — TELEPHONE ENCOUNTER
----- Message from Denita Vee sent at 4/12/2017 10:59 AM CDT -----  Contact: [facility]  Patient has a brain 'MRI that needs to be scheduled at [place]. He does not have authorization. She will also fax info [phone] thanks

## 2017-04-12 NOTE — PROGRESS NOTES
Patient's wife declined the appointment at [facility], she will prefer to have it done at [facility].

## 2017-04-17 PROBLEM — R17 ELEVATED BILIRUBIN: Status: ACTIVE | Noted: 2017-01-01

## 2017-04-17 PROBLEM — N17.9 AKI (ACUTE KIDNEY INJURY): Status: ACTIVE | Noted: 2017-01-01

## 2017-04-17 PROBLEM — D69.6 THROMBOCYTOPENIA: Status: ACTIVE | Noted: 2017-01-01

## 2017-04-17 PROBLEM — E87.1 HYPONATREMIA: Status: ACTIVE | Noted: 2017-01-01

## 2017-04-17 PROBLEM — Z93.1 S/P PERCUTANEOUS ENDOSCOPIC GASTROSTOMY (PEG) TUBE PLACEMENT: Status: ACTIVE | Noted: 2017-01-01

## 2017-04-17 PROBLEM — R41.82 ALTERED MENTAL STATE: Status: ACTIVE | Noted: 2017-01-01

## 2017-04-17 PROBLEM — R65.20 SEVERE SEPSIS: Status: ACTIVE | Noted: 2017-01-01

## 2017-04-17 PROBLEM — R74.8 ELEVATED LIVER ENZYMES: Status: ACTIVE | Noted: 2017-01-01

## 2017-04-17 PROBLEM — A41.9 SEVERE SEPSIS: Status: ACTIVE | Noted: 2017-01-01

## 2017-04-17 PROBLEM — D68.9 COAGULOPATHY: Status: ACTIVE | Noted: 2017-01-01

## 2017-04-17 NOTE — NURSING
Patient arrived from admitting in a wheelchair and needs max assist to the bed. Patient in NAD and family at the bedside to answer questions. Will continue with assessment.

## 2017-04-17 NOTE — TELEPHONE ENCOUNTER
Reason for Disposition   Nursing judgment    Protocols used: ST NO PROTOCOL CALL: SICK ADULT-A-[place]  Pt's wife called- she is wanting Dr. Carbajal to admit patient into the hospital because she feels like the patient is dying. He is weak and sleeping all of the time and they are having trouble with his PEG tube. Wife is refusing to bring patient to the ER - states that they just take her money and then send her  home.     Please contact patient with further instructions.

## 2017-04-17 NOTE — TELEPHONE ENCOUNTER
Spoken with wife and daughter. He has mental status changes, frequent falls, weak. Spoken Dr Gonzalez for Social admit and NH transfer/hospice.

## 2017-04-17 NOTE — PROGRESS NOTES
Call Dr Gonzalez with critical platelets of 90. Left voicemail, waiting on call back. Will continue to monitor.

## 2017-04-18 PROBLEM — F02.80 ALZHEIMER'S DEMENTIA WITHOUT BEHAVIORAL DISTURBANCE: Status: ACTIVE | Noted: 2017-01-01

## 2017-04-18 PROBLEM — G93.40 ENCEPHALOPATHY, UNSPECIFIED: Status: ACTIVE | Noted: 2017-01-01

## 2017-04-18 PROBLEM — D65 DIC (DISSEMINATED INTRAVASCULAR COAGULATION): Status: ACTIVE | Noted: 2017-01-01

## 2017-04-18 PROBLEM — G30.9 ALZHEIMER'S DEMENTIA WITHOUT BEHAVIORAL DISTURBANCE: Status: ACTIVE | Noted: 2017-01-01

## 2017-04-18 PROBLEM — D64.9 ANEMIA: Status: ACTIVE | Noted: 2017-01-01

## 2017-04-18 NOTE — ASSESSMENT & PLAN NOTE
Acute--likely hypovolemic due to decreased intake -although daughter insists he gets feeding/water   nacl, trend   Urine/serum osm and creatinine  Possibly siadh-also evidence volume [id number] once rehydrated   Trend levels

## 2017-04-18 NOTE — PROGRESS NOTES
Cm completed the assessment with wife and daughter (Linda).  Pt came from home, he's bben in bed fro 3-4 days, just sleeping. Too weak to get up.  PCP is Dr. Carbajal.  Family is very pleasant, wife informed me that pt has Dementia and a PEG-Tube.  Disposition:  Undetermined at this time.  Familywant pt to go to SNF on discharge to get strength back.  Cm will follow-up with pt and family.       04/18/17 1430   Discharge Assessment   Assessment Type Discharge Planning Assessment   Confirmed/corrected address and phone number on facesheet? Yes   Assessment information obtained from? Patient   Type of Healthcare Directive Received Living will  (No POA.  Wife is next of kin.)   Prior to hospitilization cognitive status: Unable to Assess  (Wife completed assessment)   Prior to hospitalization functional status: Completely Dependent  (Pt has not walked in 3-4 days.)   Current cognitive status: Unable to Assess   Current Functional Status: Completely Dependent   Arrived From admitted as an inpatient;home or self-care;home health  ([place] [employer])   Lives With child(chris), adult;spouse   Able to Return to Prior Arrangements unable to determine at this time (comments)   Is patient able to care for self after discharge? Unable to determine at this time (comments)   How many people do you have in your home that can help with your care after discharge? 2   Who are your caregiver(s) and their phone number(s)? wife- Kami Luke - [phone]   Patient's perception of discharge disposition other (comments)  (Undetermined at this time.)   Readmission Within The Last 30 Days no previous admission in last 30 days   Patient currently being followed by outpatient case management? Yes   If yes, name of outpatient case management following: insurance company assigned oupatient case management   Patient currently receives home health services? Yes   Patient previously received home health services and would like to resume services if  necessary? Yes   If yes, name of home health provider: [facility]   Does the patient currently use HME? Yes   Patient currently receives private duty nursing? N/A   Patient currently receives any other outside agency services? No   Equipment Currently Used at Home walker, rolling;bath bench   Do you have any problems affording any of your prescribed medications? No   Is the patient taking medications as prescribed? yes  ([facility] in [place], [place])   Do you have any financial concerns preventing you from receiving the healthcare you need? No  (Insurance verified as [employer])   Does the patient have transportation to healthcare appointments? Yes   Transportation Available family or friend will provide   On Dialysis? No   Does the patient receive services at the Coumadin Clinic? No  (Pt has been off of Plavix for over a week.)   Are there any open cases? No   Discharge Plan A Skilled Nursing Facility   Discharge Plan B Home Health;Home with family   Patient/Family In Agreement With Plan yes

## 2017-04-18 NOTE — PROGRESS NOTES
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 The wife refused to take him to the ER and requested direct admit. History is from chart, wife and 2 daughters as patient has had recent decline in cognitive function and even ability to communicate. He has had several falls over the last 2 weeks described as his shakiness of his right leg within a moment of standing up and then falling several times over the last 2 days. Wife reports he is out of if for about 5 min. He does not experience this when he is lying down. He is unable to get himself up and family has had to call on people to help get him up. He is now so weak he cannot get up. He had and MRI of his brain 3 days prior but they do not know results and he was to see neurology. No Ha, fever, chills or neck stiffness noted. He has chronic loss of bladder control but not bowel. 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 He has also had a cough, cannot lie down flat  But no reported chest pain, n/v or diarrhea. His abdomen has increased in size. He has multiple bruised from is falls but no edema.   Dr Bryson is his oncologist. I was called by Dr Carbajal requesting direct admit. Family desires DNR        Hospital Course:  AMS-likely metabolic encephalopathy-hypona. CT head neg acute bleed. Recent MRI-no masses, microvasc diseased. Hypona .-  CXR-pleural effusion        Interval History:      Review of Systems   Respiratory: Positive for cough and shortness of breath. Negative for wheezing.    Cardiovascular: Negative.    Gastrointestinal: Positive for abdominal distention.   Hematological: Bruises/bleeds easily.   Psychiatric/Behavioral: Positive for agitation and confusion.     Objective:     Vital Signs (Most Recent):  Temp: 97.5 °F (36.4 °C) (04/18/17 1540)  Pulse: 83 (04/18/17 1540)  Resp: 18 (04/18/17 1540)  BP: 113/64 (04/18/17 1540)  SpO2: (!) 92 % (04/18/17 1540) Vital Signs (24h Range):  Temp:  [97.1 °F (36.2 °C)-98.5 °F (36.9 °C)] 97.5 °F (36.4 °C)  Pulse:  [82-87] 83  Resp:  [18-20] 18  SpO2:  [87 %-93 %] 92 %  BP: (108-137)/(64-83) 113/64     Weight: 77.1 kg (170 lb)  Body mass index is 26.63 kg/(m^2).    Intake/Output Summary (Last 24 hours) at 04/18/17 1646  Last data filed at 04/17/17 1804   Gross per 24 hour   Intake              100 ml   Output                0 ml   Net              100 ml      Physical Exam   Constitutional:   Chronically ill male. Appears age   Lethargic but responds to verbal -followed command x 1. Not responsive to painful stimuli but did intermittently arouse however was unable to verbalize    HENT:   Head: Normocephalic and atraumatic.   Right Ear: External ear normal.   Left Ear: External ear normal.   Oral dry    Eyes: Conjunctivae are normal. Right eye exhibits no discharge. Left eye exhibits discharge. Scleral icterus is present.   Neck: Normal range of motion. No JVD present. No tracheal deviation present. No thyromegaly present.   Cardiovascular: Regular rhythm.  Tachycardia present.    Pulmonary/Chest: Effort normal. He has rhonchi in the right upper field and the left upper field. He has rales in the right middle field and the left middle field.   Abdominal: Soft. He exhibits ascites. He exhibits no abdominal bruit. Bowel sounds are decreased. There is no tenderness. There is no rebound and no guarding.   Genitourinary:   Genitourinary Comments: Nl  male   Musculoskeletal: He exhibits no edema or tenderness.   Generalized weakness.    Neurological: GCS eye subscore is 2. GCS verbal subscore is 1. GCS motor subscore is 3. He displays no Babinski's sign on the left side.   Reflex Scores:       Patellar reflexes are 0 on the right side and 0 on the left side.       Achilles reflexes are 0 on the right side and 0 on the left side.  Awakens to verbal stimuli; not following commands    Skin: Bruising and ecchymosis noted. He is not diaphoretic.   Extensive areas of ecchymosis-extremities and back   Psychiatric:   anxious   Nursing note reviewed.      Significant Labs: All pertinent labs within the past 24 hours have been reviewed.    Significant Imaging: I have reviewed and interpreted all pertinent imaging results/findings within the past 24 hours.    Assessment/Plan:      * Encephalopathy, unspecified  Acute and chronic-concern is with thrombocytopenia-check ct head,-neg   MRI-no cva or tumor / reports chronic microvasc changes.   Hypona /jaime likely contributing -giving iv nacl-possible  siadh due to tumor  Avoid asa and other anti-platelet, lovenox/heparin  EEG-eval for seizures but doubt -unable to get EEG today as pt agitated     Neurology unavailable for consult         HTN (hypertension)    HTN=chronic; controlled; add prn iv coverage for elevation    Thrombocytopenia  Acute, severe-i was initial informed they were 90k when on review 9k  Consult hematology-? Chemo-induced -unlikely as chemo was several weeks ago  vs sepsis with DIC  vs autoimmune  Avoid asa, lovenox/heparin doac, -trend and replace if evidence hemorrhage-mainly has extensive areas of ecchymosis  plavix on hold   DIC workup -indicates DIC-treating underlying cause  Transfused platelets, transfusing cryoppt, discussed at length with hematology       S/P percutaneous endoscopic gastrostomy (PEG) tube placement  Routine peg care-monitor for bleed.   Start feeds in am        Hyponatremia  Acute--likely hypovolemic due to decreased intake -although daughter insists he gets feeding/water   nacl, trend   Unable to get ua/urine na-no chung due to low platelets   Possibly siadh-also evidence volume wcgmspcht-wy-[id number] once rehydrated   Trend levels       MEGHA (acute kidney injury)  Acute-  Unable to obtain ua-did not catheterize pt due to severe thrombocytopenia  ivf given, trending labs     Severe sepsis  Sepsis protocol initiated-moderate amount hydration not knowning underlying cardiac condition      DIC (disseminated intravascular coagulation)  Acute-etiology -possible sepsis, has malignancy  Trend pt/ptt, inr, fibrinogen  D Dimer-  See thrombocytopenia        Anemia  Acute-eval for hemolysis in light of elevated bilirubin/thrombocytopenia/coagulopathy   LD , t  Monitor and transfuse if he becomes hemodynamically unstable or H/H < 7/21  Consult to hematology     Elevated lft, bilirubin-possible sepsis;   Noted ascites-on abd ultrasound; consult to GI  CTAP     VTE Risk Mitigation         Ordered     Medium Risk of VTE  Once      04/17/17 1439      Had 2 very long discussions with wife/daughter today --re results, progress and plan of care as well as potential outcomes-best case he improves and can walk again, snf then home vs no improvement and would recommed comfort care-to continue medical treatment-abx, etc as noted  And monitor closely   Pt is DNR-wife reports he said he was tired and didn't want anything else done about 2 weeks prior before he began to deteriorate.   Time spent seeing patient( greater than 1/2 spent in direct contact) : 65 min  Discussed also with hematology Dr Bryson  Notified gi re consult -    Lizzeth Gonzalez MD  Department of Hospital Medicine   [facility]

## 2017-04-18 NOTE — PROGRESS NOTES
Famotidine therapy for Brad Luke [id number] has been evaluated according to the pharmacy practice protocol.      Based on the patient's Estimated Creatinine Clearance: 35.4 mL/min (based on Cr of 1.4)., famotidine therapy has been adjusted to 20 mg once daily.    Thank you,  Aguilar Harrison, Pharmacist

## 2017-04-18 NOTE — SUBJECTIVE & OBJECTIVE
Past Medical History:   Diagnosis Date    Anticoagulant long-term use     Arthritis     Cancer     throat and colon    Coronary artery disease 1998    Coronary Stent  X1    Depression     Full dentures     Hyperlipidemia     patient is intolerant to statins    Hypertension     Thyroid disease        Past Surgical History:   Procedure Laterality Date    COLON SURGERY      COLONOSCOPY N/A 2/15/2016    Procedure: COLONOSCOPY;  Surgeon: Von Salgado MD;  Location: [facility];  Service: Endoscopy;  Laterality: N/A;    CORONARY STENT PLACEMENT      EYE SURGERY      Bilateral catarac lens    gastric tube      [facility]ERN[facility] REPAIR [facility]ilateral 2014    [facility]    TONSILLECTOMY         Review of patient's allergies indicates:   Allergen Reactions    Niacin preparations Hives       No current facility-administered medications on file prior to encounter.      Current Outpatient Prescriptions on File Prior to Encounter   Medication Sig    clopidogrel (PLAVIX) 75 mg tablet Take 1 tablet (75 mg total) by mouth once daily.    levothyroxine (SYNTHROID) 50 MCG tablet Take 1 tablet (50 mcg total) by mouth once daily.    losartan (COZAAR) 25 MG tablet Take 1 tablet by mouth nightly as needed.     [DISCONTINUED] hydrocodone-acetaminophen (HYCET) solution 7.5-325 mg/15mL     [DISCONTINUED] ondansetron (ZOFRAN-ODT) 8 MG TbDL     [DISCONTINUED] pantoprazole (PROTONIX) 40 MG tablet     [DISCONTINUED] promethazine (PHENERGAN) 25 MG tablet      Family History     Problem Relation (Age of Onset)    Asthma Grandchild    Cancer Daughter, Son, Grandchild    Diabetes Brother    No Known Problems Mother, Daughter, Daughter    Stroke Father, Brother (84)        Social History Main Topics    Smoking status: Former Smoker     Quit date: 7/11/1970    Smokeless tobacco: Never Used    Alcohol use No      Comment: rarely    Drug use: No    Sexual activity: Not on file     Review of Systems   Unable to perform ROS: Mental status  change     Objective:     Vital Signs (Most Recent):  Temp: 97 °F (36.1 °C) (04/17/17 1500)  Pulse: 83 (04/17/17 1500)  Resp: 20 (04/17/17 1500)  BP: (!) 112/57 (04/17/17 1500)  SpO2: (!) 91 % (04/17/17 1500) Vital Signs (24h Range):  Temp:  [96.8 °F (36 °C)-97 °F (36.1 °C)] 97 °F (36.1 °C)  Pulse:  [83-89] 83  Resp:  [20] 20  SpO2:  [89 %-91 %] 91 %  BP: ()/(50-57) 112/57     Weight: 77.1 kg (170 lb)  Body mass index is 26.63 kg/(m^2).    Physical Exam   Constitutional: He appears lethargic.   Chronically ill male. Appears age   Lethargic but responds to verbal -followed command x 1. Not responsive to painful stimuli but did intermittently arouse however was unable to verbalize    HENT:   Head: Normocephalic and atraumatic.   Right Ear: External ear normal.   Left Ear: External ear normal.   Oral dry    Eyes: Conjunctivae are normal. Right eye exhibits no discharge. Left eye exhibits discharge. Scleral icterus is present.   Neck: Normal range of motion. No JVD present. No tracheal deviation present. No thyromegaly present.   Cardiovascular: Regular rhythm.  Tachycardia present.    Pulmonary/Chest: Effort normal. He has rhonchi in the right upper field and the left upper field. He has rales in the right middle field and the left middle field.   Abdominal: Soft. He exhibits ascites. He exhibits no abdominal bruit. Bowel sounds are decreased. There is no tenderness. There is no rebound and no guarding.   Genitourinary:   Genitourinary Comments: Nl male   Musculoskeletal: He exhibits no edema or tenderness.   Generalized weakness.    Neurological: He appears lethargic. GCS eye subscore is 2. GCS verbal subscore is 1. GCS motor subscore is 3. He displays no Babinski's sign on the left side.   Reflex Scores:       Patellar reflexes are 0 on the right side and 0 on the left side.       Achilles reflexes are 0 on the right side and 0 on the left side.  Skin: Bruising and ecchymosis noted. He is not diaphoretic.    Nursing note reviewed.       Significant Labs: All pertinent labs within the past 24 hours have been reviewed.    Significant Imaging: I have reviewed and interpreted all pertinent imaging results/findings within the past 24 hours.

## 2017-04-18 NOTE — PLAN OF CARE
04/18/17 1220   Patient Assessment/Suction   All Lung Fields Breath Sounds rhonchi   SAMIR Breath Sounds wheezes, expiratory   PRE-TX-O2-ETCO2   O2 Device (Oxygen Therapy) nasal cannula   $ Is the patient on Oxygen? Yes   Flow (L/min) 5   SpO2 (!) 92 %   $ Pulse Oximetry - Multiple Charge Pulse Oximetry - Multiple   Pulse 89   Resp 18   Aerosol Therapy   $ Aerosol Therapy Charges Aerosol Treatment   Respiratory Treatment Status given   SVN/Inhaler Treatment Route mask   Position During Treatment HOB at 30 degrees   Patient Tolerance good   Post-Treatment   Post-treatment Heart Rate (beats/min) 89   Post-treatment Resp Rate (breaths/min) 18   All Fields Breath Sounds unchanged

## 2017-04-18 NOTE — PROGRESS NOTES
Attempted to call in consult for Dr. Herron, called office with no answer, called cell phone with no answer at this time.

## 2017-04-18 NOTE — SUBJECTIVE & OBJECTIVE
Interval History:     Review of Systems   Respiratory: Positive for cough and shortness of breath. Negative for wheezing.    Cardiovascular: Negative.    Gastrointestinal: Positive for abdominal distention.   Hematological: Bruises/bleeds easily.   Psychiatric/Behavioral: Positive for agitation and confusion.     Objective:     Vital Signs (Most Recent):  Temp: 97.5 °F (36.4 °C) (04/18/17 1540)  Pulse: 83 (04/18/17 1540)  Resp: 18 (04/18/17 1540)  BP: 113/64 (04/18/17 1540)  SpO2: (!) 92 % (04/18/17 1540) Vital Signs (24h Range):  Temp:  [97.1 °F (36.2 °C)-98.5 °F (36.9 °C)] 97.5 °F (36.4 °C)  Pulse:  [82-87] 83  Resp:  [18-20] 18  SpO2:  [87 %-93 %] 92 %  BP: (108-137)/(64-83) 113/64     Weight: 77.1 kg (170 lb)  Body mass index is 26.63 kg/(m^2).    Intake/Output Summary (Last 24 hours) at 04/18/17 1646  Last data filed at 04/17/17 1804   Gross per 24 hour   Intake              100 ml   Output                0 ml   Net              100 ml      Physical Exam   Constitutional:   Chronically ill male. Appears age   Lethargic but responds to verbal -followed command x 1. Not responsive to painful stimuli but did intermittently arouse however was unable to verbalize    HENT:   Head: Normocephalic and atraumatic.   Right Ear: External ear normal.   Left Ear: External ear normal.   Oral dry    Eyes: Conjunctivae are normal. Right eye exhibits no discharge. Left eye exhibits discharge. Scleral icterus is present.   Neck: Normal range of motion. No JVD present. No tracheal deviation present. No thyromegaly present.   Cardiovascular: Regular rhythm.  Tachycardia present.    Pulmonary/Chest: Effort normal. He has rhonchi in the right upper field and the left upper field. He has rales in the right middle field and the left middle field.   Abdominal: Soft. He exhibits ascites. He exhibits no abdominal bruit. Bowel sounds are decreased. There is no tenderness. There is no rebound and no guarding.   Genitourinary:    Genitourinary Comments: Nl male   Musculoskeletal: He exhibits no edema or tenderness.   Generalized weakness.    Neurological: GCS eye subscore is 2. GCS verbal subscore is 1. GCS motor subscore is 3. He displays no Babinski's sign on the left side.   Reflex Scores:       Patellar reflexes are 0 on the right side and 0 on the left side.       Achilles reflexes are 0 on the right side and 0 on the left side.  Awakens to verbal stimuli; not following commands    Skin: Bruising and ecchymosis noted. He is not diaphoretic.   Extensive areas of ecchymosis-extremities and back   Psychiatric:   anxious   Nursing note reviewed.      Significant Labs: All pertinent labs within the past 24 hours have been reviewed.    Significant Imaging: I have reviewed and interpreted all pertinent imaging results/findings within the past 24 hours.

## 2017-04-18 NOTE — PLAN OF CARE
Problem: Patient Care Overview  Goal: Plan of Care Review  Outcome: Ongoing (interventions implemented as appropriate)  Pt remained free from fall or injury through out shift.  Pt was transferred to a room closer to the nurses station.  Pt's o2 sats fluctuated from the mid 80's to the low 90's with pt on 5 lpm NC.  NPO status was maintained with noted PEG tube clamped with TF on hold.  Pt is to have a CXR today.  IVF infused through a patent PIV.  Pt was incontinent of urine with brief changed PRN and urine noted to be concentrated, orange in tint with a strong odor.  No new skin breakdown noted.  Pt was lethargic an sleepy but became more alert towards the end of the shift trying to climb OOB.  Pt's bed side rails are raised x3 with bed alarm on.  Pt was turned q2 to prevent further breakdown.  Pt was monitored closely for bleeding d/t critically low platelet of 9,000.  UA and cx were not obtained by straight cath to prevent trauma or bleeding from pt per Dr. Lisa FELICIANO.  Nurse rounded hourly to ensure pt safety.

## 2017-04-18 NOTE — PLAN OF CARE
04/18/17 1006   PRE-TX-O2-ETCO2   O2 Device (Oxygen Therapy) nasal cannula   $ Is the patient on Oxygen? Yes   Flow (L/min) 5   SpO2 (!) 92 %   Pulse Oximetry Type Intermittent   $ Pulse Oximetry - Multiple Charge Pulse Oximetry - Multiple

## 2017-04-18 NOTE — ASSESSMENT & PLAN NOTE
Acute--likely hypovolemic due to decreased intake -although daughter insists he gets feeding/water   nacl, trend   Unable to get ua/urine na-no chung due to low platelets   Possibly siadh-also evidence volume [id number] once rehydrated   Trend levels

## 2017-04-18 NOTE — ASSESSMENT & PLAN NOTE
Acute and chronic-concern is with thrombocytopenia-check ct head,-neg   MRI-no cva or tumor / reports chronic microvasc changes.   Hypona /jaime likely contributing -giving iv nacl-possible  siadh due to tumor  Avoid asa and other anti-platelet, lovenox/heparin  EEG-eval for seizures but doubt -unable to get EEG today as pt agitated     Neurology unavailable for consult

## 2017-04-18 NOTE — ASSESSMENT & PLAN NOTE
Acute-etiology -possible sepsis, has malignancy  Trend pt/ptt, inr, fibrinogen  D Dimer-  See thrombocytopenia

## 2017-04-18 NOTE — ASSESSMENT & PLAN NOTE
Acute and chronic-concern is with thrombocytopenia-check ct head, could be mets to brain  Hypona /jaime likely contributing componenets-giving iv nacl-prog siadh due to tumor  Avoid asa and other anti-platelet, lovenox/heparin  EEG-eval for seizures but doubt -  Requested MRI report from 3 days prior  Neurology unavailable for consult

## 2017-04-18 NOTE — H&P
[facility] Medicine  History & Physical    Patient Name: Brad Luke  MRN: [id number]  Admission Date: 4/17/2017  Attending Physician: Lizzeth Gonzalez MD   Primary Care Provider: Vikas Carbajal MD         Patient information was obtained from spouse/SO, relative(s) and past medical records.     Subjective:     Principal Problem:Altered mental state    Chief Complaint: No chief complaint on file.       HPI: Mr Luke is an 85 yo male with multiple medical problems-most recently dx Tonsillar ca s/p xrt and chemotherapy and peg tube placement who was  directly admitted from home for progressive weakness and confusion. The wife refused to take him to the ER and requested direct admit. History is from chart, wife and 2 daughters as patient has had recent decline in cognitive function and even ability to communicate. He has had several falls over the last 2 weeks described as his shakiness of his right leg within a moment of standing up and then falling several times over the last 2 days. Wife reports he is out of if for about 5 min. He does not experience this when he is lying down. He is unable to get himself up and family has had to call on people to help get him up. He is now so weak he cannot get up. He had and MRI of his brain 3 days prior but they do not know results and he was to see neurology. No Ha, fever, chills or neck stiffness noted. He has chronic loss of bladder control but not bowel. 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 He has also had a cough, cannot lie down flat  But no reported chest pain, n/v or diarrhea. His abdomen has increased in size. He has multiple bruised from is falls but no edema.   Dr Bryson is his oncologist. I was called by Dr Carbajal requesting direct admit. Family desires DNR        Past Medical History:   Diagnosis Date    Anticoagulant long-term use     Arthritis     Cancer      throat and colon    Coronary artery disease 1998    Coronary Stent  X1    Depression     Full dentures     Hyperlipidemia     patient is intolerant to statins    Hypertension     Thyroid disease        Past Surgical History:   Procedure Laterality Date    COLON SURGERY      COLONOSCOPY N/A 2/15/2016    Procedure: COLONOSCOPY;  Surgeon: Von Salgado MD;  Location[facility];  Service: Endoscopy;  Laterality: N/A;    CORONARY STENT PLACEMENT      EYE SURGERY      Bilateral catarac lens    gastric tube      [facility]ERN[facility] RE[facility]ilateral 2014    [facility]    TONSILLECTOMY         Review of patient's allergies indicates:   Allergen Reactions    Niacin preparations Hives       No current facility-administered medications on file prior to encounter.      Current Outpatient Prescriptions on File Prior to Encounter   Medication Sig    clopidogrel (PLAVIX) 75 mg tablet Take 1 tablet (75 mg total) by mouth once daily.    levothyroxine (SYNTHROID) 50 MCG tablet Take 1 tablet (50 mcg total) by mouth once daily.    losartan (COZAAR) 25 MG tablet Take 1 tablet by mouth nightly as needed.     [DISCONTINUED] hydrocodone-acetaminophen (HYCET) solution 7.5-325 mg/15mL     [DISCONTINUED] ondansetron (ZOFRAN-ODT) 8 MG TbDL     [DISCONTINUED] pantoprazole (PROTONIX) 40 MG tablet     [DISCONTINUED] promethazine (PHENERGAN) 25 MG tablet      Family History     Problem Relation (Age of Onset)    Asthma Grandchild    Cancer Daughter, Son, Grandchild    Diabetes Brother    No Known Problems Mother, Daughter, Daughter    Stroke Father, Brother (84)        Social History Main Topics    Smoking status: Former Smoker     Quit date: 7/11/1970    Smokeless tobacco: Never Used    Alcohol use No      Comment: rarely    Drug use: No    Sexual activity: Not on file     Review of Systems   Unable to perform ROS: Mental status change     Objective:     Vital Signs (Most Recent):  Temp: 97 °F (36.1 °C) (04/17/17 1500)  Pulse: 83  (04/17/17 1500)  Resp: 20 (04/17/17 1500)  BP: (!) 112/57 (04/17/17 1500)  SpO2: (!) 91 % (04/17/17 1500) Vital Signs (24h Range):  Temp:  [96.8 °F (36 °C)-97 °F (36.1 °C)] 97 °F (36.1 °C)  Pulse:  [83-89] 83  Resp:  [20] 20  SpO2:  [89 %-91 %] 91 %  BP: ()/(50-57) 112/57     Weight: 77.1 kg (170 lb)  Body mass index is 26.63 kg/(m^2).    Physical Exam   Constitutional: He appears lethargic.   Chronically ill male. Appears age   Lethargic but responds to verbal -followed command x 1. Not responsive to painful stimuli but did intermittently arouse however was unable to verbalize    HENT:   Head: Normocephalic and atraumatic.   Right Ear: External ear normal.   Left Ear: External ear normal.   Oral dry    Eyes: Conjunctivae are normal. Right eye exhibits no discharge. Left eye exhibits discharge. Scleral icterus is present.   Neck: Normal range of motion. No JVD present. No tracheal deviation present. No thyromegaly present.   Cardiovascular: Regular rhythm.  Tachycardia present.    Pulmonary/Chest: Effort normal. He has rhonchi in the right upper field and the left upper field. He has rales in the right middle field and the left middle field.   Abdominal: Soft. He exhibits ascites. He exhibits no abdominal bruit. Bowel sounds are decreased. There is no tenderness. There is no rebound and no guarding.   Genitourinary:   Genitourinary Comments: Nl male   Musculoskeletal: He exhibits no edema or tenderness.   Generalized weakness.    Neurological: He appears lethargic. GCS eye subscore is 2. GCS verbal subscore is 1. GCS motor subscore is 3. He displays no Babinski's sign on the left side.   Reflex Scores:       Patellar reflexes are 0 on the right side and 0 on the left side.       Achilles reflexes are 0 on the right side and 0 on the left side.  Skin: Bruising and ecchymosis noted. He is not diaphoretic.   Nursing note reviewed.       Significant Labs: All pertinent labs within the past 24 hours have been  reviewed.    Significant Imaging: I have reviewed and interpreted all pertinent imaging results/findings within the past 24 hours.    Assessment/Plan:     * Altered mental state  Acute and chronic-concern is with thrombocytopenia-check ct head, could be mets to brain  Hypona /megha likely contributing componenets-giving iv nacl-prog siadh due to tumor  Avoid asa and other anti-platelet, lovenox/heparin  EEG-eval for seizures but doubt -  Requested MRI report from 3 days prior  Neurology unavailable for consult         HTN (hypertension)    HTN=chronic; controlled; add prn iv coverage for elevation    Squamous cell carcinoma of tonsil  S/p treatment xrt/chemo by Dr Bryson-concern is for mets-see other      Thrombocytopenia  Acute, severe-i was initial informed they were 90k when on review 9k  Consult hematology-? Chemo-induced  vs sepsis vs autoimmune  Avoid asa, lovenox/heparin doac, -trend and replace if evidence hemorrhage-mainly has extensive areas of ecchymosis      S/P percutaneous endoscopic gastrostomy (PEG) tube placement  Routine peg care-monitor for bleed.       Hyponatremia  Acute--likely hypovolemic due to decreased intake -although daughter insists he gets feeding/water   nacl, trend   Urine/serum osm and creatinine  Possibly siadh-also evidence volume [id number] once rehydrated   Trend levels       MEGHA (acute kidney injury)  Acute-see above       Elevated liver enzymes  Acute-likely metastatic disease and not viral etiology   Noted ascites on ultrasound -cover w abx for SBP  Consult to heme/onc      Severe sepsis  Sepsis protocol initiated-moderate amount hydration not knowning underlying cardiac condition      Coagulopathy  Acute-  Trend inr     VTE Risk Mitigation         Ordered     Medium Risk of VTE  Once      04/17/17 4825      extensive history -discussed at length with family  Pt is DNR-notify of results  Pt likely with metastatic disease  As noted-will discuss hospice   Lizzeth MUSA  MD Lisa  Department of Hospital Medicine   [facility]

## 2017-04-18 NOTE — ASSESSMENT & PLAN NOTE
Acute-eval for hemolysis in light of elevated bilirubin/thrombocytopenia/coagulopathy   LD , t  Monitor and transfuse if he becomes hemodynamically unstable or H/H < 7/21  Consult to hematology

## 2017-04-18 NOTE — PLAN OF CARE
04/18/17 0129   Patient Assessment/Suction   Level of Consciousness (AVPU) responds to pain   Respiratory Effort Mild;Labored   Expansion/Accessory Muscles/Retractions expansion symmetric   All Lung Fields Breath Sounds rhonchi;wheezes, expiratory   Cough Frequency infrequent   Cough Type fair;nonproductive;loose   Is this patient in SNF or Inpatient Rehab? No   PRE-TX-O2-ETCO2   O2 Device (Oxygen Therapy) nasal cannula w/ humidification   $ Is the patient on Oxygen? Yes   Flow (L/min) 5   SpO2 (!) 91 %   Pulse Oximetry Type Intermittent   $ Pulse Oximetry - Multiple Charge Pulse Oximetry - Multiple   Positioning HOB elevated 30 degrees

## 2017-04-18 NOTE — ASSESSMENT & PLAN NOTE
Acute, severe-i was initial informed they were 90k when on review 9k  Consult hematology-? Chemo-induced  vs sepsis vs autoimmune  Avoid asa, lovenox/heparin doac, -trend and replace if evidence hemorrhage-mainly has extensive areas of ecchymosis

## 2017-04-18 NOTE — ASSESSMENT & PLAN NOTE
Acute-likely metastatic disease and not viral etiology   Noted ascites on ultrasound -cover w abx for SBP  Consult to heme/onc

## 2017-04-18 NOTE — CONSULTS
[facility]  Hematology/Oncology  Consult Note    Patient Name: Brad Luke  MRN: [id number]  Admission Date: 4/17/2017  Hospital Length of Stay: 0 days  Code Status: DNR   Attending Provider: Lizzeth Gonzalez MD  Consulting Provider: Oz Bryson MD  Primary Care Physician: Vikas Carbajal MD  Principal Problem:Altered mental state    Consults  Subjective:     HPI:   85 yo male known to my oncology service with prior diagnosis of Head and Neck CA for which he had been on a regimen of weekly erbitux with concomitant XRT. The patient's erbitux was discontinued because of his overall inability to tolerate and he proceeded with completion of the XRT alone per direction of Dr Mirza Giraldo with Rad/onc. He is a primary patient of Dr Carbajal and an ENT patient of Dr Lidia Johnson. He was originally diagnosed in Dec 2016 with T4b N2c lesion with squamous cell carcinoma and a Stage IVb.he has been noncompliant with recommended oncology clinic follow-up and has missed at least his last two appointments.  He was direct admitted by Dr Carbajal to [facility]. He has been weak and fatigued with progressive symptoms for about 2 weeks.He is not conversive and does not verbally respond to questioning. He has a head CT today with report on chart. I had long discussion with his wife who is at bedside, and the patient is a DNR. She is ok with us ordering some tests but does not want anything aggressive done. I discussed in person with Dr Gonzalez. There is a high degree of suspicion for DIC.     Oncology Treatment Plan:   [No treatment plan]    Medications:  Continuous Infusions:   sodium chloride 0.9% 1,000 mL (04/18/17 0947)     Scheduled Meds:   albuterol-ipratropium 2.5mg-0.5mg/3mL  3 mL Nebulization Q6H    ceftaroline fosamil  400 mg Intravenous Q12H    famotidine (PF)  20 mg Intravenous Q12H    levothyroxine  50 mcg Oral Before breakfast     PRN Meds:sodium chloride, acetaminophen, acetaminophen,  dextrose 50%, dextrose 50%, glucagon (human recombinant), glucose, glucose, ondansetron, ramelteon     Review of patient's allergies indicates:   Allergen Reactions    Niacin preparations Hives        Past Medical History:   Diagnosis Date    Anticoagulant long-term use     Arthritis     Cancer     throat and colon    Coronary artery disease 1998    Coronary Stent  X1    Depression     Full dentures     Hyperlipidemia     patient is intolerant to statins    Hypertension     Thyroid disease      Past Surgical History:   Procedure Laterality Date    COLON SURGERY      COLONOSCOPY N/A 2/15/2016    Procedure: COLONOSCOPY;  Surgeon: Von Salgado MD;  Location: [facility];  Service: Endoscopy;  Laterality: N/A;    CORONARY STENT PLACEMENT      EYE SURGERY      Bilateral catarac lens    gastric tube      [facility]PA[facility]ilateral 2014    [facility]    TONSILLECTOMY       Family History     Problem Relation (Age of Onset)    Asthma Grandchild    Cancer Daughter, Son, Grandchild    Diabetes Brother    No Known Problems Mother, Daughter, Daughter    Stroke Father, Brother (84)        Social History Main Topics    Smoking status: Former Smoker     Quit date: 7/11/1970    Smokeless tobacco: Never Used    Alcohol use No      Comment: rarely    Drug use: No    Sexual activity: Not on file       Review of Systems  Objective:     Vital Signs (Most Recent):  Temp: 97.5 °F (36.4 °C) (04/18/17 1429)  Pulse: 85 (04/18/17 1429)  Resp: 18 (04/18/17 1429)  BP: 137/73 (04/18/17 1429)  SpO2: 97 % (04/18/17 1429) Vital Signs (24h Range):  Temp:  [97.1 °F (36.2 °C)-98.5 °F (36.9 °C)] 97.5 °F (36.4 °C)  Pulse:  [82-87] 85  Resp:  [18-20] 18  SpO2:  [87 %-97 %] 97 %  BP: (108-137)/(65-83) 137/73     Weight: 77.1 kg (170 lb)  Body mass index is 26.63 kg/(m^2).  Body surface area is 1.91 meters squared.      Intake/Output Summary (Last 24 hours) at 04/18/17 1536  Last data filed at 04/17/17 1804   Gross per 24 hour   Intake               100 ml   Output                0 ml   Net              100 ml       Physical Exam     GEN: no acute distress; hands are covered in protective gloves; lethargic  HEAD: atraumatic/normocephalic  NECK: no masses; radiation changes to neck  HEENT: scleral icterus, normal left and rigth external ears; poor/nil dentition; no appreciable LAD or LN's  CV: RRR no murmur  CHEST: mild coarseness bilaterally; good air entry  ABDOM: NT, mild distension; no rebound or guarding; soft  EXTREM: no swelling or signs of erythema or edema  SKIN: some bruising and ecchymosis  NEURO: he is moving all 4 extremities but is not AAO  PSYCH: lethargic  LYMPH: no appreciable LAD or LN's  : no changes    Significant Labs:     Fibrinogen 92 (LL     Sodium 126 (L) mmol/L           Potassium 4.7 mmol/L         Chloride 92 (L) mmol/L         CO2 23 mmol/L         Glucose 105 mg/dL         BUN, Bld 56 (H) mg/dL         Creatinine 1.4 mg/dL         Calcium 8.0 (L) mg/dL         Total Protein 5.5 (L) g/dL         Albumin 2.5 (L) g/dL         Total Bilirubin 4.1 (H) mg/dL         Alkaline Phosphatase 118 U/L          (H) U/L          (H) U/L         Anion Gap 11 mmol/L         eGFR if African American 52 (A) mL/min/1.73 m^2         eGFR if non African American 45 (A) mL/min/1.73 m^2        WBC 19.10 (H) K/uL          RBC 3.01 (L) M/uL         Hemoglobin 8.7 (L) g/dL         Hematocrit 27.4 (L) %         MCV 91 fL         MCH 28.9 pg         MCHC 31.8 (L) %         RDW 22.4 (H) %         Platelets 6 (LL) K/uL         MPV 10.4 fL         nRBC 2 (A) /100 WBC         Gran% 87.0 (H) %         Lymph% 5.0 (L) %         Mono% 1.0 (L) %         Eosinophil% 0.0 %         Basophil% 0.0 %         Bands 7.0 %         Platelet Estimate Decreased (A)        Differential Method Manual      aPTT 34.0 (H)        LD 1036 (H) U/L         Type & Screen [[id number]] Collected: 04/18/17 1039        Updated: 04/18/17 1238        Group & Rh A NEG         Indirect Kelsey NEG       Updated: 04/17/17 1536         Prothrombin Time 19.9 (H) sec         INR 1.9 (H)          Diagnostic Results:    CT Head Without Contrast [[id number]] Resulted: 04/18/17 1614        Order Status: Completed Updated: 04/18/17 1614       Narrative:         CT HEAD WITHOUT CONTRAST    Technique: 5 mm axial images were obtained from the vertex to the skullbase, without administration of contrast.  Multiplanar reformatted images were created.    Comparison: 08/19/2013    FINDINGS:    Gray-white differentiation is well-maintained.  There is no intracranial hemorrhage.  No mass or mass effect.  The ventricles are normal in size given the degree of age-appropriate involutional change.  There is mild periventricular white matter hypoattenuation suggesting chronic ischemic microvascular change.  There is heavy calcification of the intracranial ICAs.  The calvarium is intact.Mild chronic paranasal sinus membrane thickening is present.       Impression:              No acute intracranial process.   No significant change.  Mild chronic ischemic microvascular change.            Electronically signed by: Herson Mtz MD  Date: 04/18/17  Time: 16:14        X-Ray Chest 1 View [[id number]] Resulted: 04/18/17 0840       Order Status: Completed Updated: 04/18/17 0840       Narrative:         There is calcification of the aorta consistent with atherosclerosis.  There is mild cardiomegaly and prominence of the pulmonary vasculature.  Faint infiltrate is seen in the perihilar area and both lower lobes suggesting pulmonary edema and congestive heart failure.  This is not present on the prior x-ray of 02/22/2016.  No pneumothorax is seen.       Impression:          Mild cardiomegaly and probable mild pulmonary edema consistent with congestive heart failure..  Atherosclerosis.      Electronically signed by: Oliverio Vegas MD  Date: 04/18/17  Time: 08:40        US Abdomen Complete [[id number]] Resulted:  04/17/17 1908       Order Status: Completed Updated: 04/17/17 1908       Narrative:         There are significant limitations related to bandaging material and the patient's inability to cooperate. There is right-sided hepatic ascites which is of moderate size. Much of the pancreas and upper abdominal aorta and IVC are poorly visualized. In the medial segment hepatic dome left lobe there is a 2.7 x 2.0 cm simple cyst which is demonstrated on prior CT scan. There is coarsening of hepatic echotexture. The gallbladder is contracted and there is thickening of the gallbladder wall. The common bile duct measures 4 mm. The right lobe of the liver is enlarged at 18 cm. There is a right-sided pleural effusion. There is increased renal parenchymal echogenicity suggesting medical renal disease. There is a 9 mm cyst in the right lower kidney. The left kidney is not visualized. There is no evidence of right-sided hydronephrosis. Left lower quadrant mild ascites is present. The left kidney is not seen in the spleen is poorly visualized.       Impression:          Gallbladder wall thickening which is nonspecific and could relate to the degree of distention or liver disease. Acalculous cholecystitis cannot be excluded.    Coarsening of hepatic echotexture and evidence of medical renal disease.    Right-sided pleural effusion and mild-to-moderate ascites.      Electronically signed by: DENISE LUZ MD  Date: 04/17/17  Time: 19:08              Assessment/Plan:     Active Diagnoses:    Diagnosis Date Noted POA    PRINCIPAL PROBLEM:  Altered mental state [R41.82] 04/17/2017 Yes    Thrombocytopenia [D69.6] 04/17/2017 Yes    S/P percutaneous endoscopic gastrostomy (PEG) tube placement [Z93.1] 04/17/2017 Not Applicable    Hyponatremia [E87.1] 04/17/2017 Yes    Elevated bilirubin [R17] 04/17/2017 Yes    MEGHA (acute kidney injury) [N17.9] 04/17/2017 Yes    Elevated liver enzymes [R74.8] 04/17/2017 Yes    Severe sepsis [A41.9,  R65.20] 04/17/2017 Yes    Coagulopathy [D68.9] 04/17/2017 Yes    Squamous cell carcinoma of tonsil [C09.9] 01/04/2017 Yes    HTN (hypertension) [I10] 05/16/2013 Yes     Chronic      Problems Resolved During this Admission:    Diagnosis Date Noted Date Resolved POA       (1) 85 yo male known to my oncology service with prior diagnosis of Head and Neck CA for which he had been on a regimen of weekly erbitux with concomitant XRT. The patient's erbitux was discontinued because of his overall inability to tolerate and he proceeded with completion of the XRT alone per direction of Dr Mirza Giraldo with Rad/onc. He is a primary patient of Dr Carbajal and an ENT patient of Dr Lidia Johnson. He was originally diagnosed in Dec 2016 with T4b N2c lesion with squamous cell carcinoma and a Stage IVb.  He has been off of the erbitux since Feb 21st 2017. This is not chemotherapy related in my opinion.   (2) Sepsis syndrome - lactic acidosis; he is on two IV antibiotics  (3) NCNC anemia - multifactorial with underlying anemia of chronic disorders; can not rule out hemolysis at this point but i feel that it is less likely  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; patient was given platelet transfusion by primary team; also in the differential is an autoimmune mediated process such as ITP; HUS/TTP are less likely  (5) Leucocytosis - most likely reactive - will monitor  (6) Elevated LFT's and bili, ascites - US with abnormal liver findings, he may require a CT of abdomen and  GI consult; suspect he may have liver shock syndrome with possible underlying liver pathology  (2) Alzheimer's disease  (3) Thyroid disease  (4) Prior hx/of colon cancer  (5) Former smoker  (6) CAD hx - s/p cardiac stent in past    PLAN:  1. Check peripheral smear for schistocytes; FDP, and haptoglobin  2. Check antiplatelet antibody panels and [id number]; consider CT of abdomen and GI consult  3. Monitor labs - thee is no effective  treatment for DIC except for correction of the underlying inciting event (ie the sepsis)  4. Will give him some cryoprecipitate to improve the fibrinogen  5. Recommend ID consult    i discussed the above with Dr Gonzalez in person.    Thank you for your consult.     Oz Bryson MD  Hematology/Oncology  [facility]

## 2017-04-18 NOTE — ASSESSMENT & PLAN NOTE
Acute, severe-i was initial informed they were 90k when on review 9k  Consult hematology-? Chemo-induced -unlikely as chemo was several weeks ago  vs sepsis with DIC  vs autoimmune  Avoid asa, lovenox/heparin doac, -trend and replace if evidence hemorrhage-mainly has extensive areas of ecchymosis  plavix on hold   DIC workup -indicates DIC-treating underlying cause  Transfused platelets, transfusing cryoppt, discussed at length with hematology

## 2017-04-18 NOTE — PLAN OF CARE
Problem: Patient Care Overview  Goal: Plan of Care Review  Outcome: Ongoing (interventions implemented as appropriate)  Pt remained free of injury throughout shift, confused, hand mitts on to prevent pt from pulling out iv and oxygen, Dr. Bryson consulted, 1 unit of platelets given,ct of head completed, iv fluids infusing, sinus rhythm on tele, vital signs stable, will continue to monitor pt closely.

## 2017-04-18 NOTE — ASSESSMENT & PLAN NOTE
Acute-  Unable to obtain ua-did not catheterize pt due to severe thrombocytopenia  ivf given, trending labs

## 2017-04-19 PROBLEM — E43 PROTEIN-CALORIE MALNUTRITION, SEVERE: Status: ACTIVE | Noted: 2017-01-01

## 2017-04-19 NOTE — PLAN OF CARE
04/18/17 1927   Patient Assessment/Suction   Level of Consciousness (AVPU) alert   Respiratory Effort Normal   Expansion/Accessory Muscles/Retractions expansion symmetric   All Lung Fields Breath Sounds coarse   Cough Frequency infrequent   Cough Type fair;nonproductive   Is this patient in SNF or Inpatient Rehab? No   PRE-TX-O2-ETCO2   O2 Device (Oxygen Therapy) nasal cannula w/ humidification   $ Is the patient on Oxygen? Yes   Flow (L/min) 5   SpO2 (!) 93 %   Pulse Oximetry Type Intermittent   $ Pulse Oximetry - Multiple Charge Pulse Oximetry - Multiple   Pulse 83   Resp 16   Positioning HOB elevated 30 degrees   Aerosol Therapy   $ Aerosol Therapy Charges Aerosol Treatment   Respiratory Treatment Status given   SVN/Inhaler Treatment Route mask   Position During Treatment HOB at 30 degrees   Patient Tolerance good   Post-Treatment   Post-treatment Heart Rate (beats/min) 84   Post-treatment Resp Rate (breaths/min) 16   All Fields Breath Sounds unchanged

## 2017-04-19 NOTE — PROGRESS NOTES
"    Subjective:       Patient ID: Brad Luke is a 86 y.o. male.    Chief Complaint: No chief complaint on file.      HPI  Patient is much more alert today and responds to family and is answering questions; he is currently having echo done; his daughter is at bedside and she is aware of his current critical situation and poor prognosis; i also had the opportunity to talk to Dr Gómez with ID in person; patient reported that he was "doing ok"; no CP, HA's or N/v; cough with less sputum today          Review of Systems:  See HPI above    Objective:     Vitals:    04/19/17 1510   BP: (!) 115/56   Pulse: 96   Resp: 20   Temp: 97.6 °F (36.4 °C)     GEN: no acute distress; more awake today  HEAD: atraumatic/normocephalic  NECK: no masses; radiation changes to neck  HEENT: scleral icterus, normal left and rigth external ears; poor/nil dentition; no appreciable LAD or LN's  CV: RRR no murmur  CHEST: mild coarseness bilaterally; good air entry  ABDOM: NT, mild distension; no rebound or guarding; soft  EXTREM: no swelling or signs of erythema or edema  SKIN: some bruising and ecchymosis  NEURO: he is moving all 4 extremities , answering questions better but still somewhat groggy  PSYCH: groggy, good mood  LYMPH: no appreciable LAD or LN's  : no changes      Lab Review:   Comprehensive metabolic panel [[id number]] (Abnormal) Collected: 04/19/17 0534        Specimen: Blood from Blood Updated: 04/19/17 0655        Sodium 127 (L) mmol/L         Potassium 4.6 mmol/L         Chloride 92 (L) mmol/L         CO2 18 (L) mmol/L         Glucose 113 (H) mg/dL         BUN, Bld 64 (H) mg/dL         Creatinine 1.8 (H) mg/dL         Calcium 8.0 (L) mg/dL         Total Protein 5.6 (L) g/dL         Albumin 2.6 (L) g/dL         Total Bilirubin 5.0 (H) mg/dL         Alkaline Phosphatase 110 U/L          (H) U/L          (H) U/L         Anion Gap 17 (H) mmol/L         eGFR if African American 39 (A) mL/min/1.73 m^2         " eGFR if non African American 33 (A) mL/min/1.73 m^2        Phosphorus [[id number]] (Abnormal) Collected: 04/19/17 0534       Specimen: Blood from Blood Updated: 04/19/17 0624        Phosphorus 6.4 (H) mg/dL        Magnesium [[id number]] Collected: 04/19/17 0534       Specimen: Blood from Blood Updated: 04/19/17 0624        Magnesium 2.4 mg/dL        CBC auto differential [[id number]] (Abnormal) Collected: 04/19/17 0534       Specimen: Blood from Blood Updated: 04/19/17 0621        WBC 12.90 (H) K/uL         RBC 3.08 (L) M/uL         Hemoglobin 9.0 (L) g/dL         Hematocrit 28.1 (L) %         MCV 91 fL         MCH 29.1 pg         MCHC 31.9 (L) %         RDW 23.7 (H) %         Platelets 10 (LL) K/uL         MPV 9.6 fL         Gran # 11.6 (H) K/uL         Lymph # 0.3 (L) K/uL         Mono # 1.0 K/uL         Eos # 0.0 K/uL         Baso # 0.00 K/uL         Gran% 90.1 (H) %         Lymph% 2.1 (L) %         Mono% 7.6 %         Eosinophil% 0.2 %         Basophil% 0.0 %         Platelet Estimate Decreased (A)        Aniso Moderate        Poik Slight        Poly Occasional        Ovalocytes Occasional        Target Cells Occasional        Schistocytes Present        Differential Method Automated       Haptoglobin [[id number]] (Abnormal) Collected: 04/18/17 1830       Specimen: Blood from Blood Updated: 04/19/17 0126        Haptoglobin <10 (L) mg/dL          Assessment:       1. Altered mental state    2. Encephalopathy, unspecified    3. Pericardial effusion        (1) 85 yo male known to my oncology service with prior diagnosis of Head and Neck CA for which he had been on a regimen of weekly erbitux with concomitant XRT. The patient's erbitux was discontinued because of his overall inability to tolerate and he proceeded with completion of the XRT alone per direction of Dr Mirza Giraldo with Rad/onc. He is a primary patient of Dr Carbajal and an ENT patient of Dr Lidia Johnson. 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 He has been off of the erbitux since Feb 21st 2017. This is not chemotherapy related in my opinion.   (2) Sepsis syndrome - lactic acidosis; he is on two IV antibiotics; ID has been consulted and i spoke to Dr Gómez in person today  (3) NCNC anemia - multifactorial with underlying anemia of chronic disorders; can not completely rule out hemolysis at this time but i feel that it is less likely. Haptoglobin is low but this could be reflective of any underlying liver issues; JOAQUIN and indirect abilio are both negative. HGb at 9.0 today.   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; patient was given platelet transfusion by primary team the other day; also in the differential is an autoimmune mediated process such as acute exacerbation of chronic ITP; HUS/TTP are less likely; he only had a few schistocytes per HPF and not enough to consider this TTP; [id number] pending; he was given cryoprecipitate yesterday with fibrinogen at 96 today  (5) Leucocytosis - most likely reactive - will monitor  (6) Elevated LFT's and bili, ascites - US with abnormal liver findings; he has been seen by Dr Herron with GI  (2) Alzheimer's disease  (3) Thyroid disease  (4) Prior hx/of colon cancer  (5) Former smoker  (6) CAD hx - s/p cardiac stent in past       Plan:         PLAN:  1. Await pending studies:antiplatelet antibody panels and SHYANN-TS13, etc  2. Monitor labs - there is no effective treatment for DIC except for correction of the underlying inciting event (ie the sepsis)  3. Will give cryoprecipitate again today  4. Will follow with you  5. i discussed in person with Dr Gómez

## 2017-04-19 NOTE — CHAPLAIN
[facility] confirmed to the  via phone that the  will be arriving within 1/2 hr to anoint patient, as requested. The  will continue to care for patient and family.

## 2017-04-19 NOTE — CONSULTS
Consult Note  Infectious Disease    Reason for Consult:  Sepsis?    HPI: Brad Luke is a chronically ill appearing 86 y.o. 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 He required PEG for nutrition after radiation and was admitted for inflammation around the PEG about 6 weeks ago, treated with antibiotics. He  Has not taken anything by mouth for weeks, receiving 6 cans of supplement per day. He has not walked in a year. He became increasing confused with multiple falls and was admitted directly on 4/17. He was profoundly weak, covered with ecchymoses and found to have a platelet count of 9000. He was scan to exclude intracranial hemorrhage and CXR showed bibasilar effusions. WBC were 25268 but he had no fever. He was placed empirically on Teflaro. Blood cultures are negative. Urine has not been collected due to reluctance to cath him. Daughter reports increased cough with white sputum for the last couple of weeks, with improvement now. He also had a history of prostatism, but denies recent treatment for this. He has been seen by Dr. Bryson for DIC and Dr. Herron for elevated enzymes and bilirubin. US of abdomen finds ascites and mild GB wall thickening. CT abd shows ascites as well and no acute pathology.  Daughter present is unaware of his advanced directives, but he is DNR at their request.    Review of patient's allergies indicates:   Allergen Reactions    Niacin preparations Hives     Past Medical History:   Diagnosis Date    Anticoagulant long-term use     Arthritis     Cancer     throat and colon    Coronary artery disease 1998    Coronary Stent  X1    Depression     Full dentures     Hyperlipidemia     patient is intolerant to statins    Hypertension     Thyroid disease      Past Surgical History:   Procedure Laterality Date    COLON SURGERY      COLONOSCOPY N/A 2/15/2016    Procedure: COLONOSCOPY;  Surgeon: Von  MCKINLEY Salgado MD;  Location: [facility];  Service: Endoscopy;  Laterality: N/A;    CORONARY STENT PLACEMENT      EYE SURGERY      Bilateral catarac lens    gastric tube      [facility]     [facility]    TONSILLECTOMY for cancer     PEG for poor oral intake after radiation 2017    Social History     Social History    Marital status:      Spouse name: N/A    Number of children: N/A    Years of education: N/A     Social History Main Topics    Smoking status: Former Smoker     Quit date: 1970    Smokeless tobacco: Never Used    Alcohol use No      Comment: rarely    Drug use: No    Sexual activity: Not Asked     Other Topics Concern    None     Social History Narrative     Family History   Problem Relation Age of Onset    No Known Problems Mother     Stroke Father     Diabetes Brother     Stroke Brother 84      of CVA    Cancer Daughter      breast     Cancer Son      testicular    Asthma Grandchild     Cancer Grandchild      testicular with one scrotal removeal    No Known Problems Daughter     No Known Problems Daughter     Early death Neg Hx        Pertinent medications noted:     Review of Systems:   Unobtainable.  For the past 2 years he has had confusion at night, but was competent for all of his affairs during the day through February. No treatment for dementia.     EXAM & DIAGNOSTICS REVIEWED:   Vitals:     Temp:  [97 °F (36.1 °C)-97.7 °F (36.5 °C)]   Temp: 97.6 °F (36.4 °C) (17 1510)  Pulse: 96 (17 1510)  Resp: 20 (17 1510)  BP: (!) 115/56 (17 1510)  SpO2: (!) 93 % (17 1510)    Intake/Output Summary (Last 24 hours) at 17 1624  Last data filed at 17 0618   Gross per 24 hour   Intake             49.6 ml   Output                0 ml   Net             49.6 ml       General:  Chronically ill appearing. Confused, non combative, cooperates with some elements of physical exam. Covered with bruises/ecchymoses  Eyes:  icteric,  PERRL, EOMI(grossly)  ENT:  Mouth w/hemorrhagic mucosa, gummy exudate on palpate, no ulcerations. No parotitis. Quality of dentition obscured  Neck:  Trachea midline, supple, no adenopathy appreciated  Lungs: Clear with decreased BS BLL  Heart:  RRR, no gallop/murmur noted  Abd:  soft, NT, ND, normal BS, no masses/organomegaly appreciated. PEG site left upper quadrant with no abnormalities  :  Voids, incontinent. Do not appreciate any bladder distention  Musc:  Joints without effusion, swelling, moves all  Skin:  Generally warm, dry, normal for color. Numerous bruises and confluent ecchymoses left foot and left upper arm. Hyperpigmentation around lower neck from radiation  Neuro: Awake, briefly, attends briefly. Face is symmetric  Extrem: No pitting edema, no erythema, phlebitis, cellulitis,   VAD:    Lines/Tubes/Drains:    General Labs reviewed:    Recent Labs  Lab 04/19/17  0534   WBC 12.90*   RBC 3.08*   HGB 9.0*   HCT 28.1*   PLT 10*   MCV 91   MCH 29.1   MCHC 31.9*       Recent Labs  Lab 04/19/17  0534   CALCIUM 8.0*   PROT 5.6*   *   K 4.6   CO2 18*   CL 92*   BUN 64*   CREATININE 1.8*   ALKPHOS 110   *   *   BILITOT 5.0*       Micro:  Microbiology Results (last 7 days)     Procedure Component Value Units Date/Time    Blood culture [[id number]] Collected:  04/17/17 1614    Order Status:  Completed Specimen:  Blood Updated:  04/18/17 2212     Blood Culture, Routine No Growth to date     Blood Culture, Routine No Growth to date    Blood culture [[id number]] Collected:  04/18/17 1714    Order Status:  Sent Specimen:  Blood from Antecubital, Left Updated:  04/18/17 1956    Culture, Respiratory with Gram Stain [[id number]]     Order Status:  No result Specimen:  Respiratory     Urine culture [[id number]]     Order Status:  No result Specimen:  Urine from Urine, Clean Catch         Imaging Reviewed:   CXR and CT  Abdomen and CT head      IMPRESSION & PLAN   1. Leukocytosis, transient, with DIC, no  trigger confirmed. Not convinced he has sepsis. At risk for aspiration pneumonia  2. Altered mental status, on a baseline of intermittent confusion, with multiple falls, no acute intra-cranial pathology on imaging, with hyponatremia, improved  3. Head and neck cancer, s/p irradiation and chemotherapy, with no oral intake for several weeks, nourished via PEG, presumed radiation injury to UGI tract, with hemorrhagic oral cavity from low plts   4. Pleural effusions/ascites with albumin 2.5 and normal ammonia, rising bilirubin and elevated transaminases  5.  Dementia?    Recommendation: Change teflaro to unasyn to cover oral organisms and aspiration  Recheck ammonia  Agree with non aggressive approach  Consider increased IV fluid for rising BUN and Cr  Consider doppler of portal venous system to look for portal vein thrombosis and re-US of GB  peridex for mouth care  Check bladder scan    Discussed with Dr. Bryson and daughter  Thanks, will follow

## 2017-04-19 NOTE — NURSING
Called to pts room by pts wife. Pt lying in bed sideways with blood al over sheets/body. Pt had removed IV and due to low platelets bleeds heavily. Pressure dressing applied to previous IV site, pt cleaned up, bed changed, New IV placed.  Milagros CHIU notified and request haldol be given, no restraints. Wife at bedside. Will continue to monitor.

## 2017-04-19 NOTE — CONSULTS
"[facility] Note    CC: abnormal liver chemistries    HPI 86 y.o. male is being evaluated today for moderately abnormal liver chemistries associated with transaminitis, thrombocytopenia and an elevated INR.  The patient has no history of chronic liver disease - and per chart review had normal synthetic hepatic function as of February.  He has a prior diagnosis of Head and neck CA and had been on regimen of erbitux and XRT.  The erbitux was discontinued due to patient's inablility to tolerate this chemotherapy.  The patient appears to be in DIC - unclear as to etiology ?Sepsis.   History per chart review and per daughter at bedside.    Past Medical History:   Diagnosis Date    Anticoagulant long-term use     Arthritis     Cancer     throat and colon    Coronary artery disease 1998    Coronary Stent  X1    Depression     Full dentures     Hyperlipidemia     patient is intolerant to statins    Hypertension     Thyroid disease          Review of Systems  Unable to obtain due to AMS    Physical Examination  BP (!) 125/55  Pulse 101  Temp 97.5 °F (36.4 °C)  Resp 16  Ht 5' 7" (1.702 m)  Wt 77.1 kg (170 lb)  SpO2 (!) 90%  BMI 26.63 kg/m2  General appearance: no acute distress; laying on the left side;  HENT: Normocephalic, atraumatic  Lungs: ronchi bilaterally  Heart: regular rate and rhythm  Abdomen: soft, NT mild distension; no r/g; bs present; no organomegaly  Extremities: extremities symmetric; no clubbing, cyanosis, or edema; left forearm dressed.    Labs:  Cr 1.8  Tbili 5.0        WBC 12.9  H/H 9/28  Plt 10K  D-dimer - elevated  Fibrinogen 96 (Low)    Imaging:  CT abdomen/pelvis - small amt ascites; normal appearing liver parenchyma; 2.9 cm hypodensity in liver c/w cyst.    Assessment:   85 yo male with h/o head and neck CA s/p chemo and XRT admit with AMS, presumed sepsis, and now in DIC.  Consult for abnormal liver chemistries.  Per chart review - normal liver " chemistries and synthetic function prior to this admit.  Current liver abnormalities likely due to sepsis and DIC.      Plan:  No further w/u for chronic liver disease is indicated at this time  Continue medical supportive care for DIC  Continue to monitor liver function    Call back with any additional issues.    Kan Herorn MD  [facility]  [address], [address]  [place] [address]  Office: [phone]  Fax: [phone]

## 2017-04-19 NOTE — PLAN OF CARE
04/19/17 0730   Patient Assessment/Suction   Level of Consciousness (AVPU) responds to voice   Respiratory Effort Normal   All Lung Fields Breath Sounds coarse   PRE-TX-O2-ETCO2   O2 Device (Oxygen Therapy) nasal cannula   $ Is the patient on Oxygen? Yes   Flow (L/min) 5   Oxygen Concentration (%) 40   SpO2 (!) 94 %   Pulse Oximetry Type Intermittent   $ Pulse Oximetry - Multiple Charge Pulse Oximetry - Multiple   Pulse 98   Resp 16   Positioning HOB elevated 45 degrees   Aerosol Therapy   $ Aerosol Therapy Charges Aerosol Treatment   Respiratory Treatment Status given   SVN/Inhaler Treatment Route in-line   Position During Treatment HOB at 45 degrees   Patient Tolerance good   Post-Treatment   Post-treatment Heart Rate (beats/min) 96   Post-treatment Resp Rate (breaths/min) 16   All Fields Breath Sounds unchanged   Ready to Wean/Extubation Screen   FIO2<60 (chart decimal) 0.4

## 2017-04-19 NOTE — PLAN OF CARE
Problem: Patient Care Overview  Goal: Plan of Care Review  Outcome: Ongoing (interventions implemented as appropriate)  Pt confused, drowsy. Wife at bedside. Tele in place- Trigeminy/PVC's/A-fib/NSR (reported to ARAM Linares). NPO maintained, PEG tube noted to abdomen. O2 5L per NC. Incontinent of bowel and bladder, brief changed PRN. BM this shift. Monitored for bleeding- Transfused cryoprecipitate this shift. Pt went for CT of abdomen this shift (results reported to ARAM Linares). IV abx infused per order. Mitts placed throughout shift due to agitation/pulling at lines. Pt manages to still work off mitts. Replaced as needed. Hourly rounding performed, safety maintained. Turned/repositioned. DNR status. Bed in lowest position, wheels locked, SR upx3, call light in easy reach. WIll continue to monitor.

## 2017-04-20 PROBLEM — R65.20 SEPSIS WITH METABOLIC ENCEPHALOPATHY: Status: ACTIVE | Noted: 2017-01-01

## 2017-04-20 PROBLEM — A41.9 SEPSIS WITH METABOLIC ENCEPHALOPATHY: Status: ACTIVE | Noted: 2017-01-01

## 2017-04-20 PROBLEM — G93.41 SEPSIS WITH METABOLIC ENCEPHALOPATHY: Status: ACTIVE | Noted: 2017-01-01

## 2017-04-20 NOTE — ASSESSMENT & PLAN NOTE
Acute and chronic-concern was  with thrombocytopenia-check ct head,-neg   MRI-no cva or tumor / reports chronic microvasc changes.   Hypona /jaime likely contributing -giving iv nacl  Avoid asa and other anti-platelet, lovenox/heparin  EEG-eval for seizures but doubt -unable to get EEG today as pt agitated   Neurology unavailable for consult   Likely metabolic-due to sepsis

## 2017-04-20 NOTE — PROGRESS NOTES
James spoke with WENDI Moreau about patient, she informed me that pt had absent vital signs.  James notified January with  that pt has .

## 2017-04-20 NOTE — NURSING
Critical level of platelets 7,000 and fibrinogen 91. Notified to ARAM Linares. No new orders at this time.

## 2017-04-20 NOTE — ASSESSMENT & PLAN NOTE
Acute-eval for hemolysis in light of elevated bilirubin/thrombocytopenia/coagulopathy   Elevated bili, LD , low haptoglobin and fibrinogen--heme feels not likely to be hemolysis  Monitor and transfuse if he becomes hemodynamically unstable or H/H < 7/21  Consult to hematology

## 2017-04-20 NOTE — PROGRESS NOTES
Cm spoke with family about Hospice. Wife signed the Disclosure Form.   James spoke with January with  and faxed the following information to [phone]. The following information was faxed:  Demographic sheet, face sheet, MAR,H&P and order.    11:26  Confirmation received.    12: 30 January informed me that Dr Delvalle is not on their list for Hospice.  So, James notified [facility] for a referral.  James notified Ange RN Director of Case Management.  James will send email for IP-Hospice  12:53  James sent the information to Evelio with [facility] and faxed information to [phone]:  Demographic sheet, face sheet, MAR, H&P and Orders.  Confirmation received.    12:45  James spoke with Dr. Delvalle again and he agreed to follow pt, Dr. Kenny will be here on the weekend.  James called January with [facility] to update her.  January spoke with family and they will keep [place].   James called [facility] and spoke with Ly to cancel the service, because family decided to stay with [place].

## 2017-04-20 NOTE — ASSESSMENT & PLAN NOTE
Acute-etiology -possible sepsis, has malignancy  Trend pt/ptt, inr, fibrinogen-  D Dimer-elevated   See thrombocytopenia-  Have discussed with hematology-given platelets and cryo -monitoring and treating underlying sepsis-  If increased bleeding consider FFP

## 2017-04-20 NOTE — NURSING
Discussed pt status with Dr. Shook. New order for high flow nasal cannula. Pt spouse and daughter at bedside. Dr. Shook to see pt soon. Notified respiratory therapist regarding new orders.

## 2017-04-20 NOTE — PLAN OF CARE
Problem: Patient Care Overview  Goal: Plan of Care Review  Outcome: Ongoing (interventions implemented as appropriate)  Pt restless throughout night. PRN  Haldol administered-see MAR. Resp Q6. O2 5L per NC. Tube feeding to PEG tube. Family at bedside. Pt turns/repositions. Incontinent of B&B- brief checked often and changed as needed. Bed in lowest position, wheels locked, SRupx2, call light in easy reach. Will continue to monitor.    PRN morphine administered for moaning/restlessness. Family seemed to think pt was in pain. Pt resting peacefully at this time.

## 2017-04-20 NOTE — PROGRESS NOTES
04/20/17 0712   Patient Assessment/Suction   Level of Consciousness (AVPU) alert   All Lung Fields Breath Sounds coarse   Cough Frequency infrequent   PRE-TX-O2-ETCO2   O2 Device (Oxygen Therapy) nasal cannula w/ humidification   $ Is the patient on Oxygen? Yes   Flow (L/min) 5   SpO2 (!) 92 %   Pulse Oximetry Type Intermittent   Pulse 88   Resp 18   Aerosol Therapy   $ Aerosol Therapy Charges Aerosol Treatment   Respiratory Treatment Status given   SVN/Inhaler Treatment Route mask   Position During Treatment HOB at 30 degrees   Patient Tolerance good   Post-Treatment   Post-treatment Heart Rate (beats/min) 93   Post-treatment Resp Rate (breaths/min) 18   All Fields Breath Sounds unchanged   duoneb q6, nc 5L, tolerated tx well, vitals as charted.

## 2017-04-20 NOTE — CONSULTS
"The  offered emotional support and spiritual care to this nonverbal, dying patient with his wife of 67 years and adult daughter at bedside. The family expressed gratitude for the 's anointing of the patient this morning, (while sharing regret that no family members had been present). The family asked for prayer, and the  prayed with them at bedside, asking that God "Make the way smooth, in God's time, and allow Johnnie and his family to be comforted constantly by God's presence and peace." The  will continue to care for patient and family.  "

## 2017-04-20 NOTE — PLAN OF CARE
Problem: Patient Care Overview  Goal: Plan of Care Review  Outcome: Ongoing (interventions implemented as appropriate)  Aerosol Tx given, tolerated well.

## 2017-04-20 NOTE — ASSESSMENT & PLAN NOTE
Routine peg care-monitor for bleed.   Start feeds -very low rate -family request ; likely he will not tolerate with sepsis and acute liver issues. -discussed with family

## 2017-04-20 NOTE — ASSESSMENT & PLAN NOTE
Acute-  Unable to obtain ua-did not catheterize pt due to severe thrombocytopenia  ivf given, trending labs -worsening; unable to accurately measure I/O

## 2017-04-20 NOTE — ASSESSMENT & PLAN NOTE
Sepsis protocol initiated--with judicious fluids -due to pericardial /pleural effusions/elevated BNP

## 2017-04-20 NOTE — NURSING
To room to assess pt. Pt noted with absent vital signs. Pt family at bedside. Notified Dr. Shook and Dianna the charge nurse regarding pt status.

## 2017-04-20 NOTE — ASSESSMENT & PLAN NOTE
Prn haldol  Patient with acute delirium. There is no specific treatment. Will avoid narcotics/benzos that are known to worsen condition and add PRN antipsychotics to limit behaviors of self harm. Monitor closely.

## 2017-04-20 NOTE — PLAN OF CARE
Problem: Patient Care Overview  Goal: Plan of Care Review  Outcome: Ongoing (interventions implemented as appropriate)  Patient monitored closely throughout shift for confusion and signs and symptoms of bleeding.  Patient's room close to nurses station for safety.  Family remains at bedside throughout shift.  Tube feeding given as ordered to PEG tube.  Oral care preformed. IV antibiotics given as ordered.  Patient denies pain and has no nonverbal signs of pain noted.  Plan of care reviewed with wife and daughter this shift.

## 2017-04-20 NOTE — PROGRESS NOTES
Call received from lab stating that the blood bank does not have platelets at this time and it will be a few hours until they will arrive.  Dr. Gonzalez notified.

## 2017-04-20 NOTE — PROGRESS NOTES
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 The wife refused to take him to the ER and requested direct admit. History is from chart, wife and 2 daughters as patient has had recent decline in cognitive function and even ability to communicate. He has had several falls over the last 2 weeks described as his shakiness of his right leg within a moment of standing up and then falling several times over the last 2 days. Wife reports he is out of if for about 5 min. He does not experience this when he is lying down. He is unable to get himself up and family has had to call on people to help get him up. He is now so weak he cannot get up. He had and MRI of his brain 3 days prior but they do not know results and he was to see neurology. No Ha, fever, chills or neck stiffness noted. He has chronic loss of bladder control but not bowel. 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 He has also had a cough, cannot lie down flat  But no reported chest pain, n/v or diarrhea. His abdomen has increased in size. He has multiple bruised from is falls but no edema.   Dr Bryson is his oncologist. I was called by Dr Carbajal requesting direct admit. Family desires DNR        Hospital Course:  AMS-likely metabolic encephalopathy-hypona. CT head neg acute bleed. Recent MRI-no masses, microvasc diseased. Hypona .-possible siadh-  CXR-pleural effusion  bilat    Sepsis--with DIC --likely pneumonia/aspiration--protocol initiated with judicious fluids due to pleural and pericardial effusions  Blood cs neg  Unable to obtain urine cs as cannot cath due to thrombocytopenia from dic/elevated INR  Unable to obtain sputum cs  Zosyn/Vanco-initiated/nebs/oxygen . ID consulted; prognosis guarded  CTAP-indicates above and mild ascites  Heme/onc Dr Bryson following for DIC -platelets/cryoppt given ; extensive ecch'ymosis/vasculitis lower extrem and dried blood in oral cavity noted. No active bleed ; has elevated bili/lfts-trended down     Generalized weakness-unable to participate in PT/OT           Interval History: more alert today . Mumbling   Uncooperative -tries to get out of bed     Review of Systems   Respiratory: Positive for cough and shortness of breath.    Cardiovascular: Negative.    Gastrointestinal: Positive for abdominal distention and constipation. Negative for abdominal pain and blood in stool.   Hematological: Bruises/bleeds easily.   Psychiatric/Behavioral: Positive for agitation, behavioral problems and confusion.     Objective:     Vital Signs (Most Recent):  Temp: 97.9 °F (36.6 °C) (04/19/17 1945)  Pulse: 88 (04/20/17 0712)  Resp: 18 (04/20/17 0712)  BP: 101/65 (04/19/17 1945)  SpO2: (!) 92 % (04/20/17 0712) Vital Signs (24h Range):  Temp:  [97.4 °F (36.3 °C)-98.5 °F (36.9 °C)] 97.9 °F (36.6 °C)  Pulse:  [72-98] 88  Resp:  [15-20] 18  SpO2:  [90 %-94 %] 92 %  BP: ()/(54-83) 101/65     Weight: 77.1 kg (170 lb)  Body mass index is 26.63 kg/(m^2).    Intake/Output Summary (Last 24 hours) at 04/20/17 0932  Last data filed at 04/19/17 1756   Gross per 24 hour   Intake              483 ml   Output                0 ml   Net              483 ml      Physical Exam   Constitutional:   Chronically ill male. Appears age   Lethargic but responds to verbal -followed command x 1. Not responsive to painful stimuli but did intermittently arouse however was unable  to verbalize    HENT:   Head: Normocephalic and atraumatic.   Right Ear: External ear normal.   Left Ear: External ear normal.   Oral dry    Eyes: Conjunctivae are normal. Right eye exhibits no discharge. Left eye exhibits discharge. Scleral icterus is present.   Neck: Normal range of motion. No JVD present. No tracheal deviation present. No thyromegaly present.   Cardiovascular: Regular rhythm.  Tachycardia present.    Pulmonary/Chest: Effort normal. He has decreased breath sounds in the right lower field and the left lower field. He has rhonchi in the right upper field and the left upper field. He has rales in the right middle field and the left middle field.   Abdominal: Soft. He exhibits ascites. He exhibits no abdominal bruit. Bowel sounds are decreased. There is no tenderness. There is no rebound and no guarding.   Genitourinary:   Genitourinary Comments: Nl male   Musculoskeletal: He exhibits no edema or tenderness.   Generalized weakness.    Neurological: GCS eye subscore is 2. GCS verbal subscore is 1. GCS motor subscore is 3. He displays no Babinski's sign on the left side.   Reflex Scores:       Patellar reflexes are 0 on the right side and 0 on the left side.       Achilles reflexes are 0 on the right side and 0 on the left side.  Awakens to verbal stimuli; not following commands    Skin: Bruising and ecchymosis noted. He is not diaphoretic.   Extensive areas of ecchymosis-extremities and back   Psychiatric:   anxious   Nursing note and vitals reviewed.      Significant Labs: All pertinent labs within the past 24 hours have been reviewed.    Significant Imaging: I have reviewed and interpreted all pertinent imaging results/findings within the past 24 hours.    Assessment/Plan:      * Sepsis with metabolic encephalopathy  Acute and chronic-concern was  with thrombocytopenia-check ct head,-neg   MRI-no cva or tumor / reports chronic microvasc changes.   Hypona /jaime likely contributing -giving iv  nacl  Avoid asa and other anti-platelet, lovenox/heparin  EEG-eval for seizures but doubt -unable to get EEG today as pt agitated   Neurology unavailable for consult   Likely metabolic-due to sepsis        Thrombocytopenia  Acute, severe-i was initial informed they were 90k when on review 9k  Consult hematology-? Chemo-induced -unlikely as chemo was several weeks ago  vs sepsis with DIC  vs autoimmune  Avoid asa, lovenox/heparin doac, -trend and replace if evidence hemorrhage-mainly has extensive areas of ecchymosis  plavix on hold   DIC workup -indicates DIC-treating underlying cause  Transfused platelets, transfusing cryoppt, discussed at length with hematology       S/P percutaneous endoscopic gastrostomy (PEG) tube placement  Routine peg care-monitor for bleed.   Start feeds -very low rate -family request ; likely he will not tolerate with sepsis and acute liver issues. -discussed with family       MEGHA (acute kidney injury)  Acute-  Unable to obtain ua-did not catheterize pt due to severe thrombocytopenia  ivf given, trending labs -worsening; unable to accurately measure I/O    Elevated liver enzymes  Acute-/worsening -  Noted ascites on ultrasound -cover w abx for SBP  CTAP  Consult to GI   Likely not much to be done      Severe sepsis  Sepsis protocol initiated--with judicious fluids -due to pericardial /pleural effusions/elevated BNP        DIC (disseminated intravascular coagulation)  Acute-etiology -possible sepsis, has malignancy  Trend pt/ptt, inr, fibrinogen-  D Dimer-elevated   See thrombocytopenia-  Have discussed with hematology-given platelets and cryo -monitoring and treating underlying sepsis-  If increased bleeding consider FFP         Anemia  Acute-eval for hemolysis in light of elevated bilirubin/thrombocytopenia/coagulopathy   Elevated bili, LD , low haptoglobin and fibrinogen--heme feels not likely to be hemolysis  Monitor and transfuse if he becomes hemodynamically unstable or H/H <  7/21  Consult to hematology       Alzheimer's dementia without behavioral disturbance  Prn haldol  Patient with acute delirium. There is no specific treatment. Will avoid narcotics/benzos that are known to worsen condition and add PRN antipsychotics to limit behaviors of self harm. Monitor closely.      VTE Risk Mitigation         Ordered     Medium Risk of VTE  Once      04/17/17 1439        Dispo-long discussion again with family-wife, son and daughters present-  Mentally more alert but overall prognosis with sepsis, DIC and pulm/cardiac/gi/renal issues is poor. To continue abx /treatment next 24 -48 hours/monitor progress. If worsening to discuss comfort care/hospice.   Time spent seeing patient( greater than 1/2 spent in direct contact) : 57 min  Lizzeth Gonzalez MD  Department of Hospital Medicine   [facility]

## 2017-04-20 NOTE — SUBJECTIVE & OBJECTIVE
Interval History: more alert today . Mumbling   Uncooperative -tries to get out of bed     Review of Systems   Respiratory: Positive for cough and shortness of breath.    Cardiovascular: Negative.    Gastrointestinal: Positive for abdominal distention and constipation. Negative for abdominal pain and blood in stool.   Hematological: Bruises/bleeds easily.   Psychiatric/Behavioral: Positive for agitation, behavioral problems and confusion.     Objective:     Vital Signs (Most Recent):  Temp: 97.9 °F (36.6 °C) (04/19/17 1945)  Pulse: 88 (04/20/17 0712)  Resp: 18 (04/20/17 0712)  BP: 101/65 (04/19/17 1945)  SpO2: (!) 92 % (04/20/17 0712) Vital Signs (24h Range):  Temp:  [97.4 °F (36.3 °C)-98.5 °F (36.9 °C)] 97.9 °F (36.6 °C)  Pulse:  [72-98] 88  Resp:  [15-20] 18  SpO2:  [90 %-94 %] 92 %  BP: ()/(54-83) 101/65     Weight: 77.1 kg (170 lb)  Body mass index is 26.63 kg/(m^2).    Intake/Output Summary (Last 24 hours) at 04/20/17 0932  Last data filed at 04/19/17 1756   Gross per 24 hour   Intake              483 ml   Output                0 ml   Net              483 ml      Physical Exam   Constitutional:   Chronically ill male. Appears age   Lethargic but responds to verbal -followed command x 1. Not responsive to painful stimuli but did intermittently arouse however was unable to verbalize    HENT:   Head: Normocephalic and atraumatic.   Right Ear: External ear normal.   Left Ear: External ear normal.   Oral dry    Eyes: Conjunctivae are normal. Right eye exhibits no discharge. Left eye exhibits discharge. Scleral icterus is present.   Neck: Normal range of motion. No JVD present. No tracheal deviation present. No thyromegaly present.   Cardiovascular: Regular rhythm.  Tachycardia present.    Pulmonary/Chest: Effort normal. He has decreased breath sounds in the right lower field and the left lower field. He has rhonchi in the right upper field and the left upper field. He has rales in the right middle field and  the left middle field.   Abdominal: Soft. He exhibits ascites. He exhibits no abdominal bruit. Bowel sounds are decreased. There is no tenderness. There is no rebound and no guarding.   Genitourinary:   Genitourinary Comments: Nl male   Musculoskeletal: He exhibits no edema or tenderness.   Generalized weakness.    Neurological: GCS eye subscore is 2. GCS verbal subscore is 1. GCS motor subscore is 3. He displays no Babinski's sign on the left side.   Reflex Scores:       Patellar reflexes are 0 on the right side and 0 on the left side.       Achilles reflexes are 0 on the right side and 0 on the left side.  Awakens to verbal stimuli; not following commands    Skin: Bruising and ecchymosis noted. He is not diaphoretic.   Extensive areas of ecchymosis-extremities and back   Psychiatric:   anxious   Nursing note and vitals reviewed.      Significant Labs: All pertinent labs within the past 24 hours have been reviewed.    Significant Imaging: I have reviewed and interpreted all pertinent imaging results/findings within the past 24 hours.

## 2017-04-20 NOTE — ASSESSMENT & PLAN NOTE
Acute-/worsening -  Noted ascites on ultrasound -cover w abx for SBP  CTAP  Consult to GI   Likely not much to be done

## 2017-04-21 NOTE — ASSESSMENT & PLAN NOTE
Acute--likely hypovolemic due to decreased intake -although daughter insists he gets feeding/water   nacl, trend   Unable to get ua/urine na-no chung due to low platelets   Possibly siadh-also evidence volume [id number] once rehydrated   Trend levels

## 2017-04-21 NOTE — DISCHARGE SUMMARY
[facility] Medicine  Discharge Summary      Patient Name: Brad Luke  MRN: [id number]  Admission Date: 4/17/2017  Hospital Length of Stay: 2 days  Discharge Date and Time: 4/20/2017  5:17 PM  Attending Physician: No att. 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 The wife refused to take him to the ER and requested direct admit. History is from chart, wife and 2 daughters as patient has had recent decline in cognitive function and even ability to communicate. He has had several falls over the last 2 weeks described as his shakiness of his right leg within a moment of standing up and then falling several times over the last 2 days. Wife reports he is out of if for about 5 min. He does not experience this when he is lying down. He is unable to get himself up and family has had to call on people to help get him up. He is now so weak he cannot get up. He had and MRI of his brain 3 days prior but they do not know results and he was to see neurology. No Ha, fever, chills or neck stiffness noted. He has chronic loss of bladder control but not bowel. 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 He has also had a cough, cannot lie down flat  But no reported chest pain, n/v or diarrhea. His abdomen has increased in size. He has multiple bruised from is falls but no edema.   Dr Bryson is his oncologist. I was called by Dr Carbajal requesting direct admit. Family desires DNR        * No surgery found *      Indwelling Lines/Drains at time of discharge:   Lines/Drains/Airways     Drain                 Gastrostomy/Enterostomy LUQ -- days          Gastrostomy/Enterostomy Percutaneous endoscopic gastrostomy (PEG) LUQ -- days              Hospital Course:   AMS-likely metabolic encephalopathy-hypona. CT head neg acute bleed. Recent MRI-no masses, microvasc diseased. Hypona .-possible siadh-  CXR-pleural effusion bilat    Sepsis--with DIC --likely pneumonia/aspiration--protocol initiated with judicious fluids due to pleural and pericardial effusions  Blood cs neg  Unable to obtain urine cs as cannot cath due to thrombocytopenia from dic/elevated INR  Unable to obtain sputum cs  Zosyn/Vanco-initiated/nebs/oxygen . ID consulted; prognosis guarded  CTAP-indicates above and mild ascites  Heme/onc Dr Brysno following for DIC -platelets/cryoppt given ; extensive ecch'ymosis/vasculitis lower extrem and dried blood in oral cavity noted. No active bleed ; has elevated bili/lfts-trended down     ---------------------------------  4/20 Discussed case with family at bedside. Interval development of cyanotic nose and distal extremities and worsening hypoxemia. Decision per family to make patient comfort care, which was initiated. Patient passed away on 4/20/17 due to PEA arrest in context of hypoxemic respiratory failure due to pneumonia, most likely. Also possible was undiagnosed PE, but limited ability to confirm given presence of MEGHA.            Consults:   Consults         Status Ordering Provider     Consult to Spiritual Care  Once     Provider:  (Not yet assigned)    Completed MARINO LOBO     Inpatient consult to Infectious Diseases  Once     Provider:  Bhargavi Gómez MD    Completed MADDY LEYVA     Inpatient consult to Social Work/Case Management  Once     Provider:  (Not yet assigned)    Completed MARINO LOBO     IP consult to dietary  Once     Provider:  (Not yet assigned)    Completed MADDY LEYVA          Significant Diagnostic Studies: Labs: All labs within the past 24 hours have been reviewed  Microbiology:   Blood Culture   Lab Results    Component Value Date    LABBLOO No Growth to date 04/18/2017    LABBLOO No Growth to date 04/18/2017   , Sputum Culture No results found for: GSRESP, RESPIRATORYC and Urine Culture    Lab Results   Component Value Date    LABURIN No significant growth 07/01/2016     Radiology: X-Ray: CXR: X-Ray Chest 1 View (CXR):   Results for orders placed or performed during the hospital encounter of 04/17/17   X-Ray Chest 1 View    Narrative    Comparison study: 4/18/2017    One view chest    The cardiac mediastinal silhouette is stable.  There is limited left basilar opacification on the portable.  There is slight haziness in the right costophrenic sulcus and mild bibasilar opacification.  The aeration lungs appears improved compared to the prior chest x-ray 4/18/2017 with decrease of bilateral infiltrates.  The pleural effusions evident on the CT of 4/18/2017 are not readily apparent on the AP view of the chest    Impression    Slight improvement in the chest compared to the prior exam of 4/18/2017 with decrease of the infiltrates.  Please see above discussion.      Electronically signed by: ABDIAZIZ AMIN MD  Date:     04/19/17  Time:    13:53     and X-Ray Chest PA and Lateral (CXR): No results found for this visit on 04/17/17. and KUB: X-Ray Abdomen AP 1 View (KUB): No results found for this visit on 04/17/17.  CT scan: CT ABDOMEN PELVIS WITH CONTRAST: No results found for this visit on 04/17/17. and CT ABDOMEN PELVIS WITHOUT CONTRAST:   Results for orders placed or performed during the hospital encounter of 04/17/17   CT Abdomen Pelvis  Without Contrast    Narrative    Axial scans abdomen and pelvis were obtained without IV or p.o. contrast and sagittal and coronal reformatted images obtained  Comparison study: 7/5/2016    There are bilateral pleural effusions and posterior bibasilar atelectasis and infiltrates, and there is small pericardial effusion which are new compared to the prior exam.    There is moderate size  hiatal hernia.  There is circumscribed 2.9 hypodensity in the liver remaining consistent with a cyst.  Liver is otherwise unremarkable in appearance.  No radiopaque stones are seen the gallbladder or CT findings of acute cholecystitis.  PEG tube is present.  the spleen is unremarkable in appearance as are also pancreas and adrenal glands.    There is mild bilateral perinephric stranding appearing chronic.  There is no hydronephrosis or opaque renal or ureteral stone or ureteral obstruction.There are small hypodensities in the kidneys not fully characterized but suggesting cysts and corresponding is seen on the prior study.    There are calcifications in the prostate gland.  There are postoperative changes with anastomotic sutures near rectosigmoid junction.  Appendix is not identified but no abnormal appendix is seen.  There is trace free fluid in the paracolic gutters extending down into the pelvis, and there is small amount of the subphrenic fluid around the spleen and liver.    There is fusiform abdominal aortic aneurysm AP and transverse diameters 4.7 and 4.5 cm respectively    Impression     Bilateral pleural effusions and posterior bibasilar atelectasis and consolidation..  Pericardial effusion.  A very small amount of ascites primarily around the liver and spleen.    Fusiform abdominal aortic aneurysm not significant change compared to prior study of 7/5/2016.  Hepatic cyst remaining stable.  Hiatal hernia, prior colon sigmoid colectomy corresponding is seen on the prior exam.        Electronically signed by: ABDIAZIZ AMIN MD  Date:     04/19/17  Time:    08:16        Pending Diagnostic Studies:     Procedure Component Value Units Date/Time    Platelet Autoantibody, Cell Bound [[id number]] Collected:  04/18/17 1830    Order Status:  Sent Lab Status:  In process Updated:  04/18/17 1852    Specimen:  Blood from Blood     Platelet antibodies, indirect [[id number]] Collected:  04/18/17 1830    Order Status:  Sent  Lab Status:  In process Updated:  04/18/17 1852    Specimen:  Blood from Blood         Final Active Diagnoses:    Diagnosis Date Noted POA    PRINCIPAL PROBLEM:  Sepsis with metabolic encephalopathy [A41.9, R65.20, G93.41] 04/17/2017 Yes    Protein-calorie malnutrition, severe [E43] 04/19/2017 Yes    Anemia [D64.9] 04/18/2017 Yes    Alzheimer's dementia without behavioral disturbance [G30.9, F02.80] 04/18/2017 Yes    Thrombocytopenia [D69.6] 04/17/2017 Yes    S/P percutaneous endoscopic gastrostomy (PEG) tube placement [Z93.1] 04/17/2017 Not Applicable    Hyponatremia [E87.1] 04/17/2017 Yes    Elevated bilirubin [R17] 04/17/2017 Yes    MEGHA (acute kidney injury) [N17.9] 04/17/2017 Yes    Elevated liver enzymes [R74.8] 04/17/2017 Yes    Severe sepsis [A41.9, R65.20] 04/17/2017 Yes    DIC (disseminated intravascular coagulation) [D65] 04/17/2017 Yes    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   Hypona /megha likely contributing -giving iv nacl  Avoid asa and other anti-platelet, lovenox/heparin  EEG-eval for seizures but doubt      Likely metabolic-due to sepsis        HTN (hypertension)  Hold in context of sepsis.    Squamous cell carcinoma of tonsil  S/p treatment xrt/chemo by Dr Bryson-concern is for mets    Overall poor prognosis.       Thrombocytopenia  Worsening, DIC vs bone marrow suppression as result of oncologic treatment.       S/P percutaneous endoscopic gastrostomy (PEG) tube placement  Routine peg care-monitor for bleed.   Start feeds -very low rate -family request ; likely he will not tolerate with sepsis and acute liver issues. -discussed with family        Hyponatremia  Acute--likely hypovolemic due to decreased intake -although daughter insists he gets feeding/water   nacl, trend   Unable to get ua/urine na-no chung due to low platelets   Possibly siadh-also evidence volume defadzatc-lp-qxpvcndk once rehydrated   Trend levels       Elevated bilirubin  Likely related to multi organ failure. May also have been related to some component of hemolysis as well.       MEGHA (acute kidney injury)  Acute, related to severe sepsis with multi organ failure most likely.     Elevated liver enzymes  Related to sepsis most likely and multi organ failure.       Severe sepsis  Sepsis protocol initiated--with judicious fluids -due to pericardial /pleural effusions/elevated BNP.  Unclear source, possibly pneumonia. MEGHA, respiratory failure, hepatic injury.         DIC (disseminated intravascular coagulation)  Acute-etiology -possible sepsis, has malignancy    Have discussed with hematology-given platelets and cryo -monitoring and treating underlying sepsis-          Anemia  Acute-eval for hemolysis in light of elevated bilirubin/thrombocytopenia/coagulopathy   Elevated bili, LD , low haptoglobin and fibrinogen--heme feels not likely to be hemolysis  Deferred further management due to comfort code status      Alzheimer's dementia without behavioral disturbance  Prn haldol  Patient with acute delirium. There is no specific treatment. Will avoid narcotics/benzos that are known to worsen condition and add PRN antipsychotics to limit behaviors of self harm. Monitor closely.      Protein-calorie malnutrition, severe  Unable to administer food due to decreased LOC, likely related to malignancy and poor oral intake.         Discharged Condition:     Disposition:     Follow Up:  Follow-up Information     Follow up with Oz Bryson MD.    Specialty:  Hematology and Oncology    Contact information:    [address]  [address]  [place] [address]  [phone]           Patient Instructions:   No discharge procedures on file.  Medications:  None (patient  at medical facility)  Time spent on the discharge of patient: 44 minutes    Sd Shook MD  Department of Hospital Medicine  [facility]

## 2017-04-21 NOTE — ASSESSMENT & PLAN NOTE
Likely related to multi organ failure. May also have been related to some component of hemolysis as well.

## 2017-04-21 NOTE — PROGRESS NOTES
Called to bedside for patient passing away. Patient seen and examined. Nonresponsive, no breath sounds, heart sounds. Absent carotid pulses. Absent pupillary and corneal reflexes.     Time of death 3:00pm due to PEA arrest in context of hypoxemic respiratory failure due to pneumonia, most likely. Also possible was undiagnosed PE, but limited ability to confirm given presence of MEGHA.     I expressed condolences to family at bedside.     Sd Shook MD

## 2017-04-21 NOTE — ASSESSMENT & PLAN NOTE
Acute-eval for hemolysis in light of elevated bilirubin/thrombocytopenia/coagulopathy   Elevated bili, LD , low haptoglobin and fibrinogen--heme feels not likely to be hemolysis  Deferred further management due to comfort code status

## 2017-04-21 NOTE — ASSESSMENT & PLAN NOTE
Sepsis protocol initiated--with judicious fluids -due to pericardial /pleural effusions/elevated BNP.  Unclear source, possibly pneumonia. MEGHA, respiratory failure, hepatic injury.

## 2017-04-21 NOTE — ASSESSMENT & PLAN NOTE
Acute-etiology -possible sepsis, has malignancy    Have discussed with hematology-given platelets and cryo -monitoring and treating underlying sepsis-

## 2017-04-21 NOTE — ASSESSMENT & PLAN NOTE
Acute and chronic-concern was  with thrombocytopenia-check ct head,-neg   MRI-no cva or tumor / reports chronic microvasc changes.   Hypona /jaime likely contributing -giving iv nacl  Avoid asa and other anti-platelet, lovenox/heparin  EEG-eval for seizures but doubt      Likely metabolic-due to sepsis

## 2017-04-21 NOTE — ASSESSMENT & PLAN NOTE
Unable to administer food due to decreased LOC, likely related to malignancy and poor oral intake.

## 2017-04-22 LAB
BACTERIA BLD CULT: NORMAL
BLD PROD TYP BPU: NORMAL
BLOOD UNIT EXPIRATION DATE: NORMAL
BLOOD UNIT TYPE CODE: 600
BLOOD UNIT TYPE: NORMAL
CODING SYSTEM: NORMAL
DISPENSE STATUS: NORMAL
NUM UNITS TRANS PACKED RBC: NORMAL

## 2017-04-24 LAB — BACTERIA BLD CULT: NORMAL

## 2017-04-26 LAB
HLA AB SER QL IA: NEGATIVE
PA IGG BLD QL FC: NEGATIVE
PLAT GP IA/IIA AB SER QL IA: NORMAL
PLAT GP IB/IX AB SER QL IA: NEGATIVE
PLAT GP IIB/IIIA AB SER QL IA: NEGATIVE
PLATELET AB INTERPRETATION: NORMAL
PLT AB: GP IV: NEGATIVE

## 2017-04-26 NOTE — PHYSICIAN QUERY
PT Name: Brad Luke  MR #: [id number]     Physician Query Form - Documentation Clarification      CDS/: Dillan Flores               Contact information: [email or web address]    This form is a permanent document in the medical record.     Query Date: April 26, 2017    By submitting this query, we are merely seeking further clarification of documentation. Please utilize your independent clinical judgment when addressing the question(s) below.    The Medical record reflects the following:    Supporting Clinical Findings Location in Medical Record   Respiratory Failure w/Hypoxia       D/C Summary   Discussed case with family at bedside. Interval development of cyanotic nose and distal extremities and worsening hypoxemia. Decision per family to make patient comfort care, which was initiated. Patient passed away on 4/20/17 due to PEA arrest in context of hypoxemic respiratory failure due to pneumonia, most likely.     Bilateral Pleural Effusion    He has rhonchi in the right upper field and the left upper field. He has rales in the right middle field and the left middle field.    New order for high flow nasal cannula.  On Nasal Canula w/humidification.    CO2- 21 and SPO2- 91%  CO2- 22 and SPO2- 92%, 97%  CO2- 18 and SPO2- 94%, 93%  CO2- 17 and SPO2- 92%   4/20 PN and D/C Summary                    4/18 CT    4/18 PN          4/20 Nursing Notes      4/17 Labs and Vitals  4/18 Labs and Vitals  4/19 Labs and Vitals  4/20 Labs and Vitals                                                                            Doctor, Please specify diagnosis or diagnoses associated with above clinical findings for the Acuity of Respiratory Failure w/Hypoxia:    Provider Use Only        [  x ]  Acute      [   ]  Chronic      [   ]  Acute on Chronic      [   ]  Other                                                                                                                                                                                                  [  ] Clinically undetermined

## 2017-08-07 NOTE — H&P (VIEW-ONLY)
"Subjective:       Patient ID: Brad Luke is a 86 y.o. male.    This is an established patient.      Chief Complaint: Dysphagia    HPI Comments: Patient seen for dysphagia, occurring with solid foods, frequency often/increasing, alleviated/exacerbated by nothing in particular, associated signs/symptoms including malnutrition, onset recent.  He has head and neck cancer and is undergoing XRT and chemotherapy.  PEG placement recommended for impending swallowing/nutritional difficulty related to cancer.  Of note, he had sigmoid colon cancer resected about a year ago and will be due soon for surveillance for this but this will need to be deferred until after above.  Most of the history is related by his son.      Review of Systems   Constitutional: Negative for chills, fatigue and fever.   HENT: Positive for trouble swallowing.    Respiratory: Negative for cough, shortness of breath and wheezing.    Cardiovascular: Negative for chest pain and palpitations.   Gastrointestinal: Negative for abdominal pain, constipation, diarrhea, nausea and vomiting.   All other systems reviewed and are negative.      Objective:         Vitals:    02/07/17 1417   BP: (!) 152/68   Pulse: 68   Weight: 77.9 kg (171 lb 11.8 oz)   Height: 5' 7.5" (1.715 m)       Physical Exam   Constitutional: He is oriented to person, place, and time. He appears well-developed and well-nourished.   HENT:   Head: Normocephalic and atraumatic.   Mouth/Throat: Oropharynx is clear and moist.   Eyes: Conjunctivae are normal. Pupils are equal, round, and reactive to light. No scleral icterus.   Neck: Normal range of motion. Neck supple. No thyromegaly present.   Cardiovascular: Normal rate and regular rhythm.    No murmur heard.  Pulmonary/Chest: Effort normal and breath sounds normal. He has no wheezes.   Abdominal: Soft. Bowel sounds are normal. He exhibits no distension. There is no tenderness. There is no rebound and no guarding.   Musculoskeletal: He " ANNUAL WELLNESS VISIT PRE-VISIT PLANNING     1.  Reviewed note from last office visit with PCP: YES    2.  If any orders were placed at last visit, do we have Results/Consult Notes?        •  Labs - Labs were not ordered at last office visit.       •  Imaging - Imaging was not ordered at last office visit.       •  Referrals - No referrals were ordered at last office visit.    3.  Immunizations were updated in [facility] using WebIZ?: Yes       •  WebIZ Recommendations: ZOSTAVAX (Shingles)       •  Is patient due for Tdap? NO       •  Is patient due for Shingles? NO     4.  Patient is due for the following Health Maintenance Topics:   Health Maintenance Due   Topic Date Due   • Annual Wellness Visit  1946   • IMM ZOSTER VACCINE  07/28/2006   • COLONOSCOPY  12/03/2016       - Patient already has appointment scheduled for Immunizations: ZOSTAVAX (Shingles).PLEASE ASK  PT AT APPOINTMENT ABOUT SHINGLES VACCINE.    5.  Reviewed/Updated the following with patient:       •   Preferred Pharmacy? YES       •   Preferred Lab? YES       •   Medications? YES. Was Abstract Encounter opened and chart updated? YES       •   Social History? YES. Was Abstract Encounter opened and chart updated? YES       •   Family History? YES. Was Abstract Encounter opened and chart updated? NO    6.  Care Team Updated:       •   DME Company (gait device, O2, CPAP, etc.): NO       •   Other Specialists (eye doctor, derm, GYN, cardiology, endo, etc): NO    7.  Patient has the following Care Path diagnoses on Problem List:  NONE    8.  Specialty Comments was updated with diagnosis information provided by [facility]: NO    9.  Patient was not advised: “This is a free wellness visit. The provider will screen for medical conditions to help you stay healthy. If you have other concerns to address you may be asked to discuss these at a separate visit or there may be an additional fee.”     6.  Patient was informed to arrive 15 min prior to their scheduled  appointment and bring in their medication bottles.   exhibits no edema.   Lymphadenopathy:     He has cervical adenopathy (right sided).   Neurological: He is alert and oriented to person, place, and time.   Vitals reviewed.        Lab Results   Component Value Date    WBC 7.58 12/07/2016    HGB 13.5 (L) 12/07/2016    HCT 41.4 12/07/2016    MCV 86 12/07/2016     12/07/2016       Further history was obtained from the patient's son who was present throughout the interview and states that he is currently being treated with radiation and chemotherapy for head and neck cancer and that rad onc has recommended expedited PEG tube placement because of impending swallowing difficulty related to XRT.  History is otherwise as above in the HPI.     Assessment:       1. Cancer of sigmoid colon    2. Squamous cell carcinoma of tonsil    3. Dysphagia, unspecified type        Plan:       1.  Schedule EGD with PEG placement.  Risks including infection were reviewed with patient's son, especially given the fact that he is on chemotherapy.  They understand.  2.  Continue current treatment. His son states that he is currently not taking Plavix.  3.  Will given prophylactic antibiotics on call to procedure.  4.  Surveillance colonoscopy for colon cancer will be due soon, but will need to defer for the time being until above therapy complete.  Will place reminder in system.  5.  Further recommendations to follow after above.

## 2018-06-04 ENCOUNTER — TELEPHONE (OUTPATIENT)
Dept: ADMINISTRATIVE | Facility: CLINIC | Age: 83
End: 2018-06-04

## 2018-09-06 ENCOUNTER — TELEPHONE (OUTPATIENT)
Dept: HEMATOLOGY/ONCOLOGY | Facility: CLINIC | Age: 83
End: 2018-09-06

## 2019-11-11 NOTE — TELEPHONE ENCOUNTER
Dr. Carbajal arranged direct admit to hospital for patient related to mental status change. Per Dr. Lizzeth Ram patient is to go to registration at [facility] for admitting. Patient's wife notified. Verbalized understanding.  
(0) indicator not present